# Patient Record
Sex: FEMALE | Race: WHITE | Employment: OTHER | URBAN - NONMETROPOLITAN AREA
[De-identification: names, ages, dates, MRNs, and addresses within clinical notes are randomized per-mention and may not be internally consistent; named-entity substitution may affect disease eponyms.]

---

## 2019-04-15 LAB
CHOLESTEROL, TOTAL: 125 MG/DL
CHOLESTEROL/HDL RATIO: 3.7 CALC
CREATININE: 0.9 MG/DL
HDLC SERPL-MCNC: 34 MG/DL (ref 35–70)
LDL CHOLESTEROL CALCULATED: 69 MG/DL (ref 0–160)
POTASSIUM (K+): 4.4 MMOL/L
TRIGL SERPL-MCNC: 136 MG/DL
TSH SERPL DL<=0.05 MIU/L-ACNC: 3.45 UIU/ML
VLDLC SERPL CALC-MCNC: ABNORMAL MG/DL

## 2019-05-09 ENCOUNTER — TELEPHONE (OUTPATIENT)
Dept: PRIMARY CARE CLINIC | Age: 79
End: 2019-05-09

## 2019-05-09 DIAGNOSIS — R92.8 ABNORMAL MAMMOGRAM OF LEFT BREAST: Primary | ICD-10-CM

## 2019-05-09 NOTE — TELEPHONE ENCOUNTER
Contacted patient. Put orders into [facility] radiology attention Dr. Kimberly Lorenz.  She needs a biopsy of clustered calcification on the left breast.

## 2019-05-09 NOTE — TELEPHONE ENCOUNTER
----- Message from Brandon Sinclair sent at 5/6/2019  8:15 AM EDT -----  Pt came in and said you have been trying to get a hold of her. She said if you call her again you can just leave the results on her answering machine.

## 2019-05-10 NOTE — TELEPHONE ENCOUNTER
Please call appointment desk at [facility]. Referral to radiology Department attention Dr. Ana Corrigan.  Forr an US guided biopsy of left breast. Diagnosis abnormal mammogram

## 2019-05-13 ENCOUNTER — TELEPHONE (OUTPATIENT)
Dept: PRIMARY CARE CLINIC | Age: 79
End: 2019-05-13

## 2019-05-13 NOTE — TELEPHONE ENCOUNTER
[facility] AND THEY WANTED A CLARIFICATION ON THE REFERRAL FOR DR [address]  WAS IT FOR A BIOPSY

## 2019-05-30 DIAGNOSIS — D24.2 FIBROADENOMA OF LEFT BREAST: Primary | ICD-10-CM

## 2019-07-01 RX ORDER — LEVOTHYROXINE SODIUM 0.07 MG/1
TABLET ORAL
Qty: 105 TABLET | Refills: 1 | Status: SHIPPED | OUTPATIENT
Start: 2019-07-01 | End: 2019-07-15 | Stop reason: SDUPTHER

## 2019-07-11 RX ORDER — MECLIZINE HYDROCHLORIDE 25 MG/1
25 TABLET ORAL DAILY PRN
COMMUNITY
End: 2019-07-15 | Stop reason: SDUPTHER

## 2019-07-15 ENCOUNTER — OFFICE VISIT (OUTPATIENT)
Dept: PRIMARY CARE CLINIC | Age: 79
End: 2019-07-15
Payer: MEDICARE

## 2019-07-15 VITALS
HEIGHT: 64 IN | OXYGEN SATURATION: 95 % | HEART RATE: 57 BPM | BODY MASS INDEX: 20.49 KG/M2 | RESPIRATION RATE: 16 BRPM | TEMPERATURE: 98.6 F | SYSTOLIC BLOOD PRESSURE: 150 MMHG | WEIGHT: 120 LBS | DIASTOLIC BLOOD PRESSURE: 90 MMHG

## 2019-07-15 DIAGNOSIS — E78.2 MIXED HYPERLIPIDEMIA: ICD-10-CM

## 2019-07-15 DIAGNOSIS — E03.9 ACQUIRED HYPOTHYROIDISM: ICD-10-CM

## 2019-07-15 DIAGNOSIS — I25.10 CORONARY ARTERY DISEASE INVOLVING NATIVE CORONARY ARTERY OF NATIVE HEART WITHOUT ANGINA PECTORIS: ICD-10-CM

## 2019-07-15 DIAGNOSIS — R42 DIZZINESS: ICD-10-CM

## 2019-07-15 DIAGNOSIS — I10 ESSENTIAL HYPERTENSION: Primary | ICD-10-CM

## 2019-07-15 PROBLEM — E78.5 HYPERLIPIDEMIA: Status: ACTIVE | Noted: 2019-07-15

## 2019-07-15 PROCEDURE — 99214 OFFICE O/P EST MOD 30 MIN: CPT | Performed by: INTERNAL MEDICINE

## 2019-07-15 RX ORDER — ACETAMINOPHEN 160 MG
1 TABLET,DISINTEGRATING ORAL DAILY
COMMUNITY
End: 2019-07-15 | Stop reason: SDUPTHER

## 2019-07-15 RX ORDER — LEVOTHYROXINE SODIUM 0.07 MG/1
TABLET ORAL
Qty: 315 TABLET | Refills: 5 | Status: SHIPPED | OUTPATIENT
Start: 2019-07-15 | End: 2019-10-21 | Stop reason: SDUPTHER

## 2019-07-15 RX ORDER — ATORVASTATIN CALCIUM 80 MG/1
80 TABLET, FILM COATED ORAL DAILY
Refills: 3 | COMMUNITY
Start: 2019-07-10 | End: 2019-07-15 | Stop reason: SDUPTHER

## 2019-07-15 RX ORDER — ATORVASTATIN CALCIUM 80 MG/1
80 TABLET, FILM COATED ORAL DAILY
Qty: 90 TABLET | Refills: 3 | Status: SHIPPED | OUTPATIENT
Start: 2019-07-15 | End: 2019-10-21 | Stop reason: SDUPTHER

## 2019-07-15 RX ORDER — MECLIZINE HYDROCHLORIDE 25 MG/1
25 TABLET ORAL DAILY PRN
Qty: 90 TABLET | Refills: 5 | Status: SHIPPED | OUTPATIENT
Start: 2019-07-15 | End: 2019-10-21 | Stop reason: SDUPTHER

## 2019-07-15 RX ORDER — METOPROLOL TARTRATE 50 MG/1
50 TABLET, FILM COATED ORAL 2 TIMES DAILY
Qty: 180 TABLET | Refills: 1 | Status: SHIPPED | OUTPATIENT
Start: 2019-07-15 | End: 2019-10-21 | Stop reason: SDUPTHER

## 2019-07-15 RX ORDER — ACETAMINOPHEN 160 MG
1 TABLET,DISINTEGRATING ORAL DAILY
Qty: 90 CAPSULE | Refills: 5 | Status: SHIPPED | OUTPATIENT
Start: 2019-07-15 | End: 2019-10-21 | Stop reason: SDUPTHER

## 2019-07-15 SDOH — HEALTH STABILITY: MENTAL HEALTH: HOW OFTEN DO YOU HAVE A DRINK CONTAINING ALCOHOL?: NEVER

## 2019-07-15 ASSESSMENT — ENCOUNTER SYMPTOMS
WHEEZING: 0
PHOTOPHOBIA: 0
COLOR CHANGE: 0
FACIAL SWELLING: 0
SHORTNESS OF BREATH: 0
ABDOMINAL PAIN: 0
EYE ITCHING: 0
BLOOD IN STOOL: 0
ANAL BLEEDING: 0
COUGH: 0
DIARRHEA: 0
CONSTIPATION: 0
RHINORRHEA: 0
VOMITING: 0
NAUSEA: 0
TROUBLE SWALLOWING: 0
EYE DISCHARGE: 0
EYE PAIN: 0
SORE THROAT: 0
STRIDOR: 0

## 2019-07-15 ASSESSMENT — PATIENT HEALTH QUESTIONNAIRE - PHQ9
1. LITTLE INTEREST OR PLEASURE IN DOING THINGS: 0
SUM OF ALL RESPONSES TO PHQ QUESTIONS 1-9: 0
2. FEELING DOWN, DEPRESSED OR HOPELESS: 0
SUM OF ALL RESPONSES TO PHQ QUESTIONS 1-9: 0
SUM OF ALL RESPONSES TO PHQ9 QUESTIONS 1 & 2: 0

## 2019-07-15 NOTE — PROGRESS NOTES
swelling. Gastrointestinal: Negative for abdominal pain, anal bleeding, blood in stool, constipation, diarrhea, nausea and vomiting. Endocrine: Negative for cold intolerance, heat intolerance, polydipsia, polyphagia and polyuria. Genitourinary: Negative for difficulty urinating, dysuria, flank pain, frequency, hematuria and urgency. Musculoskeletal: Negative for arthralgias, gait problem, joint swelling and myalgias. Skin: Negative for color change, pallor and rash. Allergic/Immunologic: Negative for environmental allergies and food allergies. Neurological: Positive for headaches. Negative for dizziness, tremors, seizures, syncope, speech difficulty, weakness, light-headedness and numbness. See HPI   Hematological: Negative for adenopathy. Does not bruise/bleed easily. Psychiatric/Behavioral: Negative for agitation, behavioral problems, confusion, sleep disturbance and suicidal ideas. The patient is not nervous/anxious.            Current Outpatient Medications:     atorvastatin (LIPITOR) 80 MG tablet, Take 1 tablet by mouth daily, Disp: 90 tablet, Rfl: 3    aspirin 81 MG tablet, Take 1 tablet by mouth daily, Disp: 90 tablet, Rfl: 5    Cholecalciferol (VITAMIN D3) 2000 units CAPS, Take 1 capsule by mouth daily, Disp: 90 capsule, Rfl: 5    meclizine (ANTIVERT) 25 MG tablet, Take 1 tablet by mouth daily as needed for Dizziness, Disp: 90 tablet, Rfl: 5    levothyroxine (SYNTHROID) 75 MCG tablet, TAKE 1 TABLET BY MOUTH ONCE DAILY -ON  MONDAY,  WEDNESDAY,  AND  FRIDAY  TAKE  1.5  TABLETS, Disp: 315 tablet, Rfl: 5    metoprolol tartrate (LOPRESSOR) 50 MG tablet, Take 1 tablet by mouth 2 times daily, Disp: 180 tablet, Rfl: 1     Allergies   Allergen Reactions    Adhesive Tape Rash        Past Medical History:   Diagnosis Date    CAD (coronary artery disease)     Colon polyps     GERD (gastroesophageal reflux disease)     Hyperlipidemia     Hypertension     Hypothyroidism     Melanoma [facility])     Osteopenia        Health Maintenance Due   Topic Date Due    Potassium monitoring  1940    Creatinine monitoring  1940    DTaP/Tdap/Td vaccine (1 - Tdap) 10/26/1958    Shingles Vaccine (1 of 2) 10/26/1989    Annual Wellness Visit (AWV)  10/26/2002    DEXA (modify frequency per FRAX score)  10/26/2004    Pneumococcal 65+ years Vaccine (1 of 2 - PCV13) 10/26/2004        Patient Active Problem List   Diagnosis    Essential hypertension    Dizziness    Hypothyroidism    Hyperlipidemia    CAD (coronary artery disease)        Past Surgical History:   Procedure Laterality Date     SECTION      CORONARY ARTERY BYPASS GRAFT  2014    LITHOTRIPSY      CHRISTIAN AND BSO          Family History   Problem Relation Age of Onset    Coronary Art Dis Mother     Other Mother         CVA    Heart Attack Father     Pancreatic Cancer Sister     Lung Cancer Sister         Social History     Tobacco Use    Smoking status: Never Smoker    Smokeless tobacco: Never Used   Substance Use Topics    Alcohol use: Never     Frequency: Never    Drug use: Never        Objective  Vitals:    07/15/19 0800 07/15/19 0806   BP: (!) 152/94 (!) 150/90   Site: Left Upper Arm Left Upper Arm   Position: Sitting Sitting   Cuff Size: Medium Adult Medium Adult   Pulse: 57    Resp: 16    Temp: 98.6 °F (37 °C)    TempSrc: Temporal    SpO2: 95%    Weight: 120 lb (54.4 kg)    Height: 5' 4\" (1.626 m)         Exam:  Physical Exam   Constitutional: She is oriented to person, place, and time. She appears well-developed and well-nourished. HENT:   Head: Normocephalic. Right Ear: External ear normal.   Left Ear: External ear normal.   Nose: Nose normal.   Mouth/Throat: Oropharynx is clear and moist. No oropharyngeal exudate. Eyes: Pupils are equal, round, and reactive to light. Conjunctivae and EOM are normal. Right eye exhibits no discharge. Left eye exhibits no discharge. No scleral icterus.    Neck: Normal range of

## 2019-07-15 NOTE — PATIENT INSTRUCTIONS
Increase Metoprolol to 50 mg twice a day  Monitor blood pressure and pulse  If facial spasm no better in 7-10 days, call and will refer you to a neurologisy

## 2019-10-20 ASSESSMENT — ENCOUNTER SYMPTOMS
EYE DISCHARGE: 0
ABDOMINAL PAIN: 0
NAUSEA: 0
EYE PAIN: 0
RHINORRHEA: 0
DIARRHEA: 0
COLOR CHANGE: 0
EYE ITCHING: 0
SHORTNESS OF BREATH: 0
FACIAL SWELLING: 0
COUGH: 0
STRIDOR: 0
CONSTIPATION: 0
SORE THROAT: 0
TROUBLE SWALLOWING: 0
VOMITING: 0
PHOTOPHOBIA: 0
ANAL BLEEDING: 0
WHEEZING: 0
BLOOD IN STOOL: 0

## 2019-10-21 ENCOUNTER — HOSPITAL ENCOUNTER (OUTPATIENT)
Age: 79
Discharge: HOME | End: 2019-10-23
Payer: MEDICARE

## 2019-10-21 ENCOUNTER — OFFICE VISIT (OUTPATIENT)
Dept: PRIMARY CARE CLINIC | Age: 79
End: 2019-10-21
Payer: MEDICARE

## 2019-10-21 VITALS
SYSTOLIC BLOOD PRESSURE: 128 MMHG | TEMPERATURE: 98.4 F | HEART RATE: 49 BPM | BODY MASS INDEX: 20.66 KG/M2 | RESPIRATION RATE: 16 BRPM | HEIGHT: 64 IN | WEIGHT: 121 LBS | DIASTOLIC BLOOD PRESSURE: 84 MMHG | OXYGEN SATURATION: 96 %

## 2019-10-21 DIAGNOSIS — M54.2 NECK PAIN ON LEFT SIDE: ICD-10-CM

## 2019-10-21 DIAGNOSIS — I25.10 CORONARY ARTERY DISEASE INVOLVING NATIVE CORONARY ARTERY OF NATIVE HEART WITHOUT ANGINA PECTORIS: ICD-10-CM

## 2019-10-21 DIAGNOSIS — I10 ESSENTIAL HYPERTENSION: ICD-10-CM

## 2019-10-21 DIAGNOSIS — E55.9 VITAMIN D DEFICIENCY: ICD-10-CM

## 2019-10-21 DIAGNOSIS — E03.9 ACQUIRED HYPOTHYROIDISM: ICD-10-CM

## 2019-10-21 DIAGNOSIS — Z23 NEED FOR INFLUENZA VACCINATION: ICD-10-CM

## 2019-10-21 DIAGNOSIS — E78.2 MIXED HYPERLIPIDEMIA: ICD-10-CM

## 2019-10-21 DIAGNOSIS — C43.71 MALIGNANT MELANOMA OF RIGHT LOWER EXTREMITY INCLUDING HIP (HCC): ICD-10-CM

## 2019-10-21 DIAGNOSIS — I10 ESSENTIAL HYPERTENSION: Primary | ICD-10-CM

## 2019-10-21 DIAGNOSIS — R42 DIZZINESS: ICD-10-CM

## 2019-10-21 PROBLEM — C43.9 MELANOMA (HCC): Status: ACTIVE | Noted: 2019-10-21

## 2019-10-21 LAB
ALBUMIN SERPL-MCNC: 4.7 G/DL (ref 3.5–5.2)
ALP BLD-CCNC: 126 U/L (ref 35–104)
ALT SERPL-CCNC: 26 U/L (ref 0–32)
ANION GAP SERPL CALCULATED.3IONS-SCNC: 18 MMOL/L (ref 7–16)
AST SERPL-CCNC: 26 U/L (ref 0–31)
BILIRUB SERPL-MCNC: 0.5 MG/DL (ref 0–1.2)
BUN BLDV-MCNC: 18 MG/DL (ref 8–23)
CALCIUM SERPL-MCNC: 10.1 MG/DL (ref 8.6–10.2)
CHLORIDE BLD-SCNC: 105 MMOL/L (ref 98–107)
CHOLESTEROL, TOTAL: 135 MG/DL (ref 0–199)
CO2: 23 MMOL/L (ref 22–29)
CREAT SERPL-MCNC: 1 MG/DL (ref 0.5–1)
GFR AFRICAN AMERICAN: >60
GFR NON-AFRICAN AMERICAN: 53 ML/MIN/1.73
GLUCOSE BLD-MCNC: 101 MG/DL (ref 74–99)
HDLC SERPL-MCNC: 42 MG/DL
LDL CHOLESTEROL CALCULATED: 65 MG/DL (ref 0–99)
POTASSIUM SERPL-SCNC: 4.7 MMOL/L (ref 3.5–5)
SODIUM BLD-SCNC: 146 MMOL/L (ref 132–146)
T4 FREE: 1.67 NG/DL (ref 0.93–1.7)
TOTAL PROTEIN: 8 G/DL (ref 6.4–8.3)
TRIGL SERPL-MCNC: 140 MG/DL (ref 0–149)
TSH SERPL DL<=0.05 MIU/L-ACNC: 5.84 UIU/ML (ref 0.27–4.2)
VLDLC SERPL CALC-MCNC: 28 MG/DL

## 2019-10-21 PROCEDURE — 84439 ASSAY OF FREE THYROXINE: CPT

## 2019-10-21 PROCEDURE — 99214 OFFICE O/P EST MOD 30 MIN: CPT | Performed by: INTERNAL MEDICINE

## 2019-10-21 PROCEDURE — 36415 COLL VENOUS BLD VENIPUNCTURE: CPT

## 2019-10-21 PROCEDURE — 80061 LIPID PANEL: CPT

## 2019-10-21 PROCEDURE — 84443 ASSAY THYROID STIM HORMONE: CPT

## 2019-10-21 PROCEDURE — G0008 ADMIN INFLUENZA VIRUS VAC: HCPCS | Performed by: INTERNAL MEDICINE

## 2019-10-21 PROCEDURE — 80053 COMPREHEN METABOLIC PANEL: CPT

## 2019-10-21 PROCEDURE — 90653 IIV ADJUVANT VACCINE IM: CPT | Performed by: INTERNAL MEDICINE

## 2019-10-21 RX ORDER — MECLIZINE HYDROCHLORIDE 25 MG/1
25 TABLET ORAL DAILY PRN
Qty: 90 TABLET | Refills: 3 | Status: SHIPPED | OUTPATIENT
Start: 2019-10-21 | End: 2020-01-02 | Stop reason: SDUPTHER

## 2019-10-21 RX ORDER — ACETAMINOPHEN 160 MG
1 TABLET,DISINTEGRATING ORAL DAILY
Qty: 90 CAPSULE | Refills: 3 | Status: SHIPPED | OUTPATIENT
Start: 2019-10-21 | End: 2020-01-02 | Stop reason: SDUPTHER

## 2019-10-21 RX ORDER — METOPROLOL TARTRATE 50 MG/1
50 TABLET, FILM COATED ORAL 2 TIMES DAILY
Qty: 180 TABLET | Refills: 3 | Status: SHIPPED | OUTPATIENT
Start: 2019-10-21 | End: 2020-01-02 | Stop reason: SDUPTHER

## 2019-10-21 RX ORDER — LEVOTHYROXINE SODIUM 0.07 MG/1
TABLET ORAL
Qty: 135 TABLET | Refills: 3 | Status: SHIPPED | OUTPATIENT
Start: 2019-10-21 | End: 2020-01-02 | Stop reason: SDUPTHER

## 2019-10-21 RX ORDER — ATORVASTATIN CALCIUM 80 MG/1
80 TABLET, FILM COATED ORAL DAILY
Qty: 90 TABLET | Refills: 3 | Status: SHIPPED | OUTPATIENT
Start: 2019-10-21 | End: 2020-01-02 | Stop reason: SDUPTHER

## 2019-11-04 ENCOUNTER — TELEPHONE (OUTPATIENT)
Dept: PRIMARY CARE CLINIC | Age: 79
End: 2019-11-04

## 2019-11-04 DIAGNOSIS — R92.8 ABNORMAL MAMMOGRAM: Primary | ICD-10-CM

## 2019-11-20 PROBLEM — Z23 NEED FOR INFLUENZA VACCINATION: Status: RESOLVED | Noted: 2019-10-21 | Resolved: 2019-11-20

## 2019-12-19 ASSESSMENT — ENCOUNTER SYMPTOMS
EYE ITCHING: 0
NAUSEA: 0
RHINORRHEA: 0
ABDOMINAL PAIN: 0
BLOOD IN STOOL: 0
ANAL BLEEDING: 0
SORE THROAT: 0
COLOR CHANGE: 0
CONSTIPATION: 0
DIARRHEA: 0
SHORTNESS OF BREATH: 0
EYE DISCHARGE: 0
PHOTOPHOBIA: 0
STRIDOR: 0
VOMITING: 0
FACIAL SWELLING: 0
TROUBLE SWALLOWING: 0
WHEEZING: 0
EYE PAIN: 0
COUGH: 0

## 2019-12-19 NOTE — PROGRESS NOTES
trigemential neutralize, probiotic BID,, Disp: , Rfl:     atorvastatin (LIPITOR) 80 MG tablet, Take 1 tablet by mouth daily, Disp: 90 tablet, Rfl: 3    Cholecalciferol (VITAMIN D3) 50 MCG (2000) CAPS, Take 1 capsule by mouth daily, Disp: 90 capsule, Rfl: 3    levothyroxine (SYNTHROID) 75 MCG tablet, TAKE 1 TABLET BY MOUTH ONCE DAILY -ON  MONDAY,  TAKE 2 TABS, ON WED AND , TAKE 1.5 TABS, Disp: 150 tablet, Rfl: 3    metoprolol tartrate (LOPRESSOR) 50 MG tablet, Take 1 tablet by mouth 2 times daily, Disp: 180 tablet, Rfl: 3    meclizine (ANTIVERT) 25 MG tablet, Take 1 tablet by mouth daily as needed for Dizziness, Disp: 90 tablet, Rfl: 3    aspirin 81 MG tablet, Take 1 tablet by mouth daily, Disp: 90 tablet, Rfl: 5     Allergies   Allergen Reactions    Adhesive Tape Rash        Past Medical History:   Diagnosis Date    CAD (coronary artery disease)     Colon polyps     GERD (gastroesophageal reflux disease)     Hyperlipidemia     Hypothyroidism     [facility] ([email or web address]     Osteopenia        Health Maintenance Due   Topic Date Due    Shingles Vaccine (1 of 2) 10/26/1989    Annual Wellness Visit (AWV)  2019        Patient Active Problem List   Diagnosis    Essential hypertension    Dizziness    Hypothyroidism    Hyperlipidemia    CAD (coronary artery disease)    Melanoma ([email or web address]    Neck pain on left side    Need for vaccination with PVAX (pneumococcal vaccination)    Elevated liver enzymes    Chronic night sweats    Leg cramps, sleep related        Past Surgical History:   Procedure Laterality Date     SECTION      CORONARY ARTERY BYPASS GRAFT      LITHOTRIPSY      CHRISTIAN AND BSO          Family History   Problem Relation Age of Onset    Coronary Art Dis Mother     Other Mother         CVA    Heart Attack Father     Pancreatic Cancer Sister     Lung Cancer Sister         Social History     Tobacco Use    Smoking status: Never Smoker    Smokeless tobacco: Never Used

## 2020-01-02 ENCOUNTER — OFFICE VISIT (OUTPATIENT)
Dept: PRIMARY CARE CLINIC | Age: 80
End: 2020-01-02
Payer: MEDICARE

## 2020-01-02 VITALS
HEIGHT: 64 IN | WEIGHT: 119 LBS | HEART RATE: 52 BPM | RESPIRATION RATE: 16 BRPM | OXYGEN SATURATION: 97 % | DIASTOLIC BLOOD PRESSURE: 88 MMHG | BODY MASS INDEX: 20.32 KG/M2 | SYSTOLIC BLOOD PRESSURE: 132 MMHG | TEMPERATURE: 98.6 F

## 2020-01-02 VITALS
HEART RATE: 52 BPM | TEMPERATURE: 98.6 F | DIASTOLIC BLOOD PRESSURE: 88 MMHG | BODY MASS INDEX: 20.32 KG/M2 | OXYGEN SATURATION: 97 % | HEIGHT: 64 IN | SYSTOLIC BLOOD PRESSURE: 132 MMHG | RESPIRATION RATE: 16 BRPM | WEIGHT: 119 LBS

## 2020-01-02 PROBLEM — R61 CHRONIC NIGHT SWEATS: Status: ACTIVE | Noted: 2020-01-02

## 2020-01-02 PROBLEM — G47.62 LEG CRAMPS, SLEEP RELATED: Status: ACTIVE | Noted: 2020-01-02

## 2020-01-02 PROBLEM — R74.8 ELEVATED LIVER ENZYMES: Status: ACTIVE | Noted: 2020-01-02

## 2020-01-02 PROBLEM — Z23 NEED FOR VACCINATION WITH PVAX (PNEUMOCOCCAL VACCINATION): Status: ACTIVE | Noted: 2020-01-02

## 2020-01-02 PROCEDURE — G0009 ADMIN PNEUMOCOCCAL VACCINE: HCPCS | Performed by: INTERNAL MEDICINE

## 2020-01-02 PROCEDURE — 99213 OFFICE O/P EST LOW 20 MIN: CPT | Performed by: INTERNAL MEDICINE

## 2020-01-02 PROCEDURE — G0439 PPPS, SUBSEQ VISIT: HCPCS | Performed by: INTERNAL MEDICINE

## 2020-01-02 PROCEDURE — 90732 PPSV23 VACC 2 YRS+ SUBQ/IM: CPT | Performed by: INTERNAL MEDICINE

## 2020-01-02 RX ORDER — LEVOTHYROXINE SODIUM 0.07 MG/1
TABLET ORAL
Qty: 150 TABLET | Refills: 3 | Status: CANCELLED | OUTPATIENT
Start: 2020-01-02

## 2020-01-02 RX ORDER — ACETAMINOPHEN 160 MG
1 TABLET,DISINTEGRATING ORAL DAILY
Qty: 90 CAPSULE | Refills: 3 | Status: SHIPPED
Start: 2020-01-02 | End: 2020-07-22 | Stop reason: SDUPTHER

## 2020-01-02 RX ORDER — MECLIZINE HYDROCHLORIDE 25 MG/1
25 TABLET ORAL DAILY PRN
Qty: 90 TABLET | Refills: 3 | Status: SHIPPED
Start: 2020-01-02 | End: 2020-10-22 | Stop reason: SDUPTHER

## 2020-01-02 RX ORDER — ACETAMINOPHEN 160 MG
1 TABLET,DISINTEGRATING ORAL DAILY
Qty: 90 CAPSULE | Refills: 3 | Status: CANCELLED | OUTPATIENT
Start: 2020-01-02

## 2020-01-02 RX ORDER — METOPROLOL TARTRATE 50 MG/1
50 TABLET, FILM COATED ORAL 2 TIMES DAILY
Qty: 180 TABLET | Refills: 3 | Status: CANCELLED | OUTPATIENT
Start: 2020-01-02

## 2020-01-02 RX ORDER — ATORVASTATIN CALCIUM 80 MG/1
80 TABLET, FILM COATED ORAL DAILY
Qty: 90 TABLET | Refills: 3 | Status: CANCELLED | OUTPATIENT
Start: 2020-01-02

## 2020-01-02 RX ORDER — LEVOTHYROXINE SODIUM 0.07 MG/1
TABLET ORAL
Qty: 150 TABLET | Refills: 3 | Status: SHIPPED
Start: 2020-01-02 | End: 2020-07-22 | Stop reason: SDUPTHER

## 2020-01-02 RX ORDER — MECLIZINE HYDROCHLORIDE 25 MG/1
25 TABLET ORAL DAILY PRN
Qty: 90 TABLET | Refills: 3 | Status: CANCELLED | OUTPATIENT
Start: 2020-01-02

## 2020-01-02 RX ORDER — METOPROLOL TARTRATE 50 MG/1
50 TABLET, FILM COATED ORAL 2 TIMES DAILY
Qty: 180 TABLET | Refills: 3 | Status: SHIPPED | OUTPATIENT
Start: 2020-01-02 | End: 2020-01-10

## 2020-01-02 RX ORDER — ATORVASTATIN CALCIUM 80 MG/1
80 TABLET, FILM COATED ORAL DAILY
Qty: 90 TABLET | Refills: 3 | Status: SHIPPED
Start: 2020-01-02 | End: 2020-07-22 | Stop reason: SDUPTHER

## 2020-01-02 ASSESSMENT — PATIENT HEALTH QUESTIONNAIRE - PHQ9
SUM OF ALL RESPONSES TO PHQ QUESTIONS 1-9: 0
SUM OF ALL RESPONSES TO PHQ QUESTIONS 1-9: 0

## 2020-01-02 ASSESSMENT — LIFESTYLE VARIABLES: HOW OFTEN DO YOU HAVE A DRINK CONTAINING ALCOHOL: 0

## 2020-01-02 NOTE — PATIENT INSTRUCTIONS
Personalized Preventive Plan for [facility] - 1/2/2020  Medicare offers a range of preventive health benefits. Some of the tests and screenings are paid in full while other may be subject to a deductible, co-insurance, and/or copay. Some of these benefits include a comprehensive review of your medical history including lifestyle, illnesses that may run in your family, and various assessments and screenings as appropriate. After reviewing your medical record and screening and assessments performed today your provider may have ordered immunizations, labs, imaging, and/or referrals for you. A list of these orders (if applicable) as well as your Preventive Care list are included within your After Visit Summary for your review. Other Preventive Recommendations:    · A preventive eye exam performed by an eye specialist is recommended every 1-2 years to screen for glaucoma; cataracts, macular degeneration, and other eye disorders. · A preventive dental visit is recommended every 6 months. · Try to get at least 150 minutes of exercise per week or 10,000 steps per day on a pedometer . · Order or download the FREE \"Exercise & Physical Activity: Your Everyday Guide\" from [employer]. Call 0-537.257.3461 or search [employer] online. · You need 6543-8958 mg of calcium and 8210-3311 IU of vitamin D per day. It is possible to meet your calcium requirement with diet alone, but a vitamin D supplement is usually necessary to meet this goal.  · When exposed to the sun, use a sunscreen that protects against both UVA and UVB radiation with an SPF of 30 or greater. Reapply every 2 to 3 hours or after sweating, drying off with a towel, or swimming. · Always wear a seat belt when traveling in a car. Always wear a helmet when riding a bicycle or motorcycle. Patient Education        Pneumococcal Polysaccharide Vaccine: What You Need to Know  Why get vaccinated?   Vaccination can

## 2020-01-02 NOTE — ASSESSMENT & PLAN NOTE
Blood pressures are stable. Continue medications and monitor blood pressures at home. Call office if systolics are over 115 over diastolics over 90.   Check CMP

## 2020-01-02 NOTE — PROGRESS NOTES
Medicare Annual Wellness Visit  Name: Darío Lopes Date: 2020   MRN: <[id number]> Sex: Female   Age: [de-identified] y.o. Ethnicity: Non-/Non    : 1940 Race: [facility] is here for [employer]    Screenings for behavioral, psychosocial and functional/safety risks, and cognitive dysfunction are all negative except as indicated below. These results, as well as other patient data from the [address] form, are documented in Flowsheets linked to this Encounter. Allergies   Allergen Reactions    Adhesive Tape Rash       Prior to Visit Medications    Medication Sig Taking?  Authorizing Provider   BIOTIN MAXIMUM PO Take by mouth Yes Historical Provider, MD   ASHWAGANDHA PO Take by mouth 800mg QD Yes Historical Provider, MD   NONFORMULARY Liver detox OTC (Herbal, trigemential neutralize, probiotic BID, Yes Historical Provider, MD   atorvastatin (LIPITOR) 80 MG tablet Take 1 tablet by mouth daily Yes Aditi Bailey DO   Cholecalciferol (VITAMIN D3) 2000 units CAPS Take 1 capsule by mouth daily Yes Aditi Bailey DO   levothyroxine (SYNTHROID) 75 MCG tablet TAKE 1 TABLET BY MOUTH ONCE DAILY -ON  MONDAY,  WEDNESDAY,  AND  FRIDAY  TAKE  1.5  TABLETS  Patient taking differently: TAKE 1 TABLET BY MOUTH ONCE DAILY -ON  MONDAY,  TAKE 2 TABS, ON WED AND , TAKE 1.5 TABS Yes Aditi Bailey DO   metoprolol tartrate (LOPRESSOR) 50 MG tablet Take 1 tablet by mouth 2 times daily Yes Aditi Bailey DO   meclizine (ANTIVERT) 25 MG tablet Take 1 tablet by mouth daily as needed for Dizziness Yes Aditi Bailey DO   aspirin 81 MG tablet Take 1 tablet by mouth daily Yes Alexandro Queen DO       Past Medical History:   Diagnosis Date    CAD (coronary artery disease)     Colon polyps     GERD (gastroesophageal reflux disease)     Hyperlipidemia     Hypertension     Hypothyroidism     Melanoma ([address]     Osteopenia        Past Surgical History:   Procedure Laterality Date     SECTION      CORONARY ARTERY BYPASS GRAFT  2014    LITHOTRIPSY      CHRISTIAN AND BSO         Family History   Problem Relation Age of Onset    Coronary Art Dis Mother     Other Mother         CVA    Heart Attack Father     Pancreatic Cancer Sister     Lung Cancer Sister        CareTeam (Including outside providers/suppliers regularly involved in providing care):   Patient Care Team:  Tracy Gaffney DO as PCP - General (Internal Medicine)  Tracy Gaffney DO as PCP - [facility] Empaneled Provider    Wt Readings from Last 3 Encounters:   20 119 lb (54 kg)   10/21/19 121 lb (54.9 kg)   07/15/19 120 lb (54.4 kg)     Vitals:    20 0757   BP: 132/88   Site: Left Upper Arm   Position: Sitting   Cuff Size: Medium Adult   Pulse: 52   Resp: 16   Temp: 98.6 °F (37 °C)   TempSrc: Temporal   SpO2: 97%   Weight: 119 lb (54 kg)   Height: 5' 4\" (1.626 m)     Body mass index is 20.43 kg/m². Based upon direct observation of the patient, evaluation of cognition reveals recent and remote memory intact. Patient [facility] a [facility] [address] [facility] [facility]. Recommend that they bring copies to scan into the chart. Patient's complete Health Risk Assessment and screening values have been reviewed and are found in Flowsheets. The following problems were reviewed today and where indicated follow up appointments were made and/or referrals ordered. Positive Risk Factor Screenings with Interventions:     General Health:  General  In general, how would you say your health is?: Very Good  In the past 7 days, have you experienced any of the following?  New or Increased Pain, New or Increased Fatigue, Loneliness, Social Isolation, Stress or Anger?: (!) New or Increased Pain  Do you get the social and emotional support that you need?: Yes  Do you have a Living Will?: Yes  General Health Risk Interventions:  · Pain issues: patient declines any further evaluation/treatment for this issue he has some left sacroiliac pain last week which she attributes to pushing a patient in a wheelchair. She is taking Tylenol for this and the pain has  improved. Health Habits/Nutrition:  Health Habits/Nutrition  Do you exercise for at least 20 minutes 2-3 times per week?: Yes  Have you lost any weight without trying in the past 3 months?: No  Do you eat fewer than 2 meals per day?: (!) Yes  Have you seen a dentist within the past year?: Yes  Body mass index is 20.43 kg/m². Health Habits/Nutrition Interventions:  · Nutritional issues:  patient is not ready to address his/her nutritional/weight issues at this time she says she is always eaten about 1 meal a day with snacks in between. She does not want to change her eating habits. Personalized Preventive Plan   Current Health Maintenance Status  Immunization History   Administered Date(s) Administered    Influenza Vaccine, unspecified formulation 10/24/2017    Influenza Virus Vaccine 10/24/2017    Influenza, High Dose (Fluzone 65 yrs and older) 10/10/2018    Influenza, Triv, inactivated, subunit, adjuvanted, IM (Fluad 65 yrs and older) 10/21/2019    Pneumococcal Conjugate 13-valent ([id number]) 02/11/2016    Td (Adult), 2 Lf Tetanus Toxoid, Pf (Td, Absorbed) 05/17/2018    Tdap (Boostrix, Adacel) 10/21/2008        Health Maintenance   Topic Date Due    Shingles Vaccine (1 of 2) 10/26/1989    Pneumococcal 65+ years Vaccine (2 of 2 - PPSV23) 02/11/2017    Annual Wellness Visit (AWV)  06/23/2019    Lipid screen  10/21/2020    TSH testing  10/21/2020    Potassium monitoring  10/21/2020    Creatinine monitoring  10/21/2020    DTaP/Tdap/Td vaccine (3 - Td) 05/17/2028    DEXA (modify frequency per FRAX score)  Completed    Flu vaccine  Completed     Recommendations for Preventive Services Due: see orders and patient instructions/AVS.  .   Recommended screening schedule for the next 5-10 years is provided to the patient in written form: see Patient Instructions/AVS.    Karina was seen today for medicare awv.     Diagnoses and all orders for this visit:    Mixed hyperlipidemia    Vitamin D deficiency    Acquired hypothyroidism    Dizziness    Essential hypertension

## 2020-01-10 ENCOUNTER — TELEPHONE (OUTPATIENT)
Dept: PRIMARY CARE CLINIC | Age: 80
End: 2020-01-10

## 2020-01-10 RX ORDER — METOPROLOL SUCCINATE 100 MG/1
TABLET, EXTENDED RELEASE ORAL
Qty: 60 TABLET | Refills: 5 | Status: SHIPPED
Start: 2020-01-10 | End: 2020-04-14 | Stop reason: SINTOL

## 2020-01-10 NOTE — TELEPHONE ENCOUNTER
Patient picked up new script for Metoprolol 50 mg twice daily. Per Patient she's been taking 100 mg twice daily. Please advise.         Electronically signed by Tu Salcedo LPN on 3/04/92 at 1:57 PM

## 2020-04-09 ENCOUNTER — TELEPHONE (OUTPATIENT)
Dept: PRIMARY CARE CLINIC | Age: 80
End: 2020-04-09

## 2020-04-09 NOTE — TELEPHONE ENCOUNTER
Patient is scheduled for a phone visit with you on 4/14/2020. She is asking if she is supposed to have thyroid bloodwork done in the meantime?  If so, please input orders and let me know so I can fax to [phone] [facility].

## 2020-04-11 LAB
ALBUMIN SERPL-MCNC: NORMAL G/DL
ALP BLD-CCNC: NORMAL U/L
ALT SERPL-CCNC: NORMAL U/L
ANION GAP SERPL CALCULATED.3IONS-SCNC: NORMAL MMOL/L
AST SERPL-CCNC: NORMAL U/L
BILIRUB SERPL-MCNC: NORMAL MG/DL
BUN BLDV-MCNC: 23 MG/DL
CALCIUM SERPL-MCNC: NORMAL MG/DL
CHLORIDE BLD-SCNC: NORMAL MMOL/L
CHOLESTEROL, TOTAL: 130 MG/DL
CHOLESTEROL/HDL RATIO: 3.6
CO2: NORMAL
CREAT SERPL-MCNC: 0.95 MG/DL
GFR CALCULATED: NORMAL
GLUCOSE BLD-MCNC: NORMAL MG/DL
HDLC SERPL-MCNC: 36 MG/DL (ref 35–70)
LDL CHOLESTEROL CALCULATED: 70 MG/DL (ref 0–160)
POTASSIUM SERPL-SCNC: 4.5 MMOL/L
SODIUM BLD-SCNC: NORMAL MMOL/L
T4 FREE: 1.4
TOTAL PROTEIN: NORMAL
TRIGL SERPL-MCNC: 158 MG/DL
TSH SERPL DL<=0.05 MIU/L-ACNC: 3.79 UIU/ML
VLDLC SERPL CALC-MCNC: 94 MG/DL

## 2020-04-14 ENCOUNTER — VIRTUAL VISIT (OUTPATIENT)
Dept: PRIMARY CARE CLINIC | Age: 80
End: 2020-04-14
Payer: MEDICARE

## 2020-04-14 VITALS — BODY MASS INDEX: 19.97 KG/M2 | HEIGHT: 64 IN | WEIGHT: 117 LBS

## 2020-04-14 PROCEDURE — G2012 BRIEF CHECK IN BY MD/QHP: HCPCS | Performed by: INTERNAL MEDICINE

## 2020-04-14 RX ORDER — METOPROLOL SUCCINATE 50 MG/1
50 TABLET, EXTENDED RELEASE ORAL 2 TIMES DAILY
Qty: 180 TABLET | Refills: 1 | Status: SHIPPED
Start: 2020-04-14 | End: 2020-07-22 | Stop reason: SDUPTHER

## 2020-04-14 NOTE — PROGRESS NOTES
Rayna Kelly is a [de-identified] y.o. female evaluated via telephone on 4/14/2020. Consent:  She and/or health care decision maker is aware that that she may receive a bill for this telephone service, depending on her insurance coverage, and has provided verbal consent to proceed: Yes      Documentation:  I communicated with the patient and/or health care decision maker about discussed her complaints of fatigue ever since her metoprolol XL was increased to 100 mg twice daily. Her blood pressures have been stable. Her pulse has not been elevated per patient. We are going to go ahead and decrease her metoprolol XL to 50 mg twice daily. Patient was told to monitor her blood pressure and pulse. Prescription was sent to her pharmacy. Otherwise she has no complaints. Discussed her blood work that was done recently. LDL was excellent. However her HDL had gone down to 36 and her triglycerides were little elevated at 158. She is already on Atorvastatin 80 mg daily and she will continue on this medication    . Details of this discussion including any medical advice provided. She will continue on the lower dose of metoprolol and monitor her vital signs symptoms. She will stay on her other medications as before. And I will see her in 3 months for regular office visit      I affirm this is a Patient Initiated Episode with an Established Patient who has not had a related appointment within my department in the past 7 days or scheduled within the next 24 hours.     Total Time: minutes: 5-10 minutes    Note: not billable if this call serves to triage the patient into an appointment for the relevant concern      Randy Faustin

## 2020-07-22 ENCOUNTER — HOSPITAL ENCOUNTER (OUTPATIENT)
Age: 80
Discharge: HOME | End: 2020-07-24
Payer: MEDICARE

## 2020-07-22 ENCOUNTER — OFFICE VISIT (OUTPATIENT)
Dept: PRIMARY CARE CLINIC | Age: 80
End: 2020-07-22
Payer: MEDICARE

## 2020-07-22 VITALS
HEART RATE: 51 BPM | WEIGHT: 122 LBS | SYSTOLIC BLOOD PRESSURE: 130 MMHG | DIASTOLIC BLOOD PRESSURE: 70 MMHG | HEIGHT: 64 IN | BODY MASS INDEX: 20.83 KG/M2 | RESPIRATION RATE: 18 BRPM | TEMPERATURE: 97.2 F | OXYGEN SATURATION: 97 %

## 2020-07-22 PROBLEM — R14.0 ABDOMINAL DISTENTION: Status: ACTIVE | Noted: 2020-07-22

## 2020-07-22 PROBLEM — E55.9 VITAMIN D DEFICIENCY: Status: ACTIVE | Noted: 2020-07-22

## 2020-07-22 PROBLEM — E89.41 HOT FLASHES DUE TO SURGICAL MENOPAUSE: Status: ACTIVE | Noted: 2020-07-22

## 2020-07-22 PROBLEM — R74.8 ELEVATED LIVER ENZYMES: Status: RESOLVED | Noted: 2020-01-02 | Resolved: 2020-07-22

## 2020-07-22 PROBLEM — Z23 NEED FOR VACCINATION WITH PVAX (PNEUMOCOCCAL VACCINATION): Status: RESOLVED | Noted: 2020-01-02 | Resolved: 2020-07-22

## 2020-07-22 LAB
ALBUMIN SERPL-MCNC: 4.3 G/DL (ref 3.5–5.2)
ALP BLD-CCNC: 100 U/L (ref 35–104)
ALT SERPL-CCNC: 24 U/L (ref 0–32)
ANION GAP SERPL CALCULATED.3IONS-SCNC: 14 MMOL/L (ref 7–16)
AST SERPL-CCNC: 25 U/L (ref 0–31)
BILIRUB SERPL-MCNC: 0.5 MG/DL (ref 0–1.2)
BUN BLDV-MCNC: 19 MG/DL (ref 8–23)
CALCIUM SERPL-MCNC: 9.9 MG/DL (ref 8.6–10.2)
CHLORIDE BLD-SCNC: 103 MMOL/L (ref 98–107)
CHOLESTEROL, TOTAL: 120 MG/DL (ref 0–199)
CO2: 25 MMOL/L (ref 22–29)
CREAT SERPL-MCNC: 0.9 MG/DL (ref 0.5–1)
GFR AFRICAN AMERICAN: >60
GFR NON-AFRICAN AMERICAN: >60 ML/MIN/1.73
GLUCOSE BLD-MCNC: 89 MG/DL (ref 74–99)
HDLC SERPL-MCNC: 31 MG/DL
LDL CHOLESTEROL CALCULATED: 47 MG/DL (ref 0–99)
POTASSIUM SERPL-SCNC: 4.9 MMOL/L (ref 3.5–5)
SODIUM BLD-SCNC: 142 MMOL/L (ref 132–146)
T4 FREE: 1.79 NG/DL (ref 0.93–1.7)
TOTAL PROTEIN: 7.3 G/DL (ref 6.4–8.3)
TRIGL SERPL-MCNC: 208 MG/DL (ref 0–149)
TSH SERPL DL<=0.05 MIU/L-ACNC: 1.73 UIU/ML (ref 0.27–4.2)
VLDLC SERPL CALC-MCNC: 42 MG/DL

## 2020-07-22 PROCEDURE — 80061 LIPID PANEL: CPT

## 2020-07-22 PROCEDURE — 36415 COLL VENOUS BLD VENIPUNCTURE: CPT

## 2020-07-22 PROCEDURE — 84443 ASSAY THYROID STIM HORMONE: CPT

## 2020-07-22 PROCEDURE — 84439 ASSAY OF FREE THYROXINE: CPT

## 2020-07-22 PROCEDURE — 99214 OFFICE O/P EST MOD 30 MIN: CPT | Performed by: INTERNAL MEDICINE

## 2020-07-22 PROCEDURE — 80053 COMPREHEN METABOLIC PANEL: CPT

## 2020-07-22 RX ORDER — ATORVASTATIN CALCIUM 80 MG/1
80 TABLET, FILM COATED ORAL DAILY
Qty: 90 TABLET | Refills: 3 | Status: SHIPPED
Start: 2020-07-22 | End: 2020-10-22 | Stop reason: SDUPTHER

## 2020-07-22 RX ORDER — ACETAMINOPHEN 160 MG
1 TABLET,DISINTEGRATING ORAL DAILY
Qty: 90 CAPSULE | Refills: 3 | Status: SHIPPED
Start: 2020-07-22 | End: 2020-10-22 | Stop reason: SDUPTHER

## 2020-07-22 RX ORDER — METOPROLOL SUCCINATE 50 MG/1
50 TABLET, EXTENDED RELEASE ORAL 2 TIMES DAILY
Qty: 180 TABLET | Refills: 1 | Status: SHIPPED
Start: 2020-07-22 | End: 2020-09-09 | Stop reason: SDUPTHER

## 2020-07-22 RX ORDER — LEVOTHYROXINE SODIUM 0.07 MG/1
TABLET ORAL
Qty: 150 TABLET | Refills: 3 | Status: SHIPPED
Start: 2020-07-22 | End: 2020-10-22 | Stop reason: SDUPTHER

## 2020-07-22 RX ORDER — ESTRADIOL 0.1 MG/G
1 CREAM VAGINAL DAILY
Qty: 1 TUBE | Refills: 3 | Status: SHIPPED
Start: 2020-07-22 | End: 2020-10-22 | Stop reason: ALTCHOICE

## 2020-07-22 ASSESSMENT — ENCOUNTER SYMPTOMS
VOMITING: 0
SHORTNESS OF BREATH: 0
EYE DISCHARGE: 0
STRIDOR: 0
PHOTOPHOBIA: 0
DIARRHEA: 0
FACIAL SWELLING: 0
SORE THROAT: 0
ANAL BLEEDING: 0
WHEEZING: 0
EYE PAIN: 0
EYE ITCHING: 0
CONSTIPATION: 0
ABDOMINAL PAIN: 0
ABDOMINAL DISTENTION: 1
RHINORRHEA: 0
NAUSEA: 0
BLOOD IN STOOL: 0
COLOR CHANGE: 0
TROUBLE SWALLOWING: 0
COUGH: 0

## 2020-07-22 NOTE — ASSESSMENT & PLAN NOTE
Trial estradiol 0.1 mg vaginal cream 1 applicatorful every other day and see if that helps her hot flashes if not we will increase it to once a day. Monitor liver enzymes and get yearly mammograms while on  the estrogen.   Note her last liver enzymes were within normal limits

## 2020-07-22 NOTE — PROGRESS NOTES
Shingrix. Blood work in October 2019 showed fasting blood sugar of 101, alkaline phosphatase slightly elevated at 126 with the rest of her liver enzymes being normal.  Her TSH was slightly elevated so we increased her levothyroxine. She is on 75 mcg a day. She takes 2 pills on Monday, 1-1/2 pills on Wednesday and Friday and 1 pill the rest of the week. recheck  TSH. Last bone density scan was done in 2015. We discussed risk and repeating that and she declines. She has a history of malignant melanoma of her leg. She follows up yearly with Dr. Michael Reed for surveillance. She also complains of leg cramps at night. She is tried dill pickle juice and other over-the-counter remedies. Nothing  worked. We discussed putting her on Requip but she declines as she does not want to take any more medications. .  Review of Systems   Constitutional: Negative for appetite change, fatigue and unexpected weight change. HENT: Negative for congestion, ear pain, facial swelling, rhinorrhea, sore throat, tinnitus and trouble swallowing. Left neck discomfort   Eyes: Negative for photophobia, pain, discharge, itching and visual disturbance. Respiratory: Negative for cough, shortness of breath, wheezing and stridor. Cardiovascular: Negative for chest pain, palpitations and leg swelling. Gastrointestinal: Positive for abdominal distention. Negative for abdominal pain, anal bleeding, blood in stool, constipation, diarrhea, nausea and vomiting. Endocrine: Negative for cold intolerance, heat intolerance, polydipsia, polyphagia and polyuria. Genitourinary: Negative for difficulty urinating, dysuria, flank pain, frequency, hematuria and urgency. Night sweats, see HPI   Musculoskeletal: Negative for arthralgias, gait problem, joint swelling and myalgias. See HPI   Skin: Negative for color change, pallor and rash. Allergic/Immunologic: Negative for environmental allergies and food allergies. is no guarding or rebound. Comments: Abdomen slightly distended. No hepatosplenomegaly noted. Musculoskeletal: Normal range of motion. General: No tenderness or deformity. Lymphadenopathy:      Cervical: No cervical adenopathy. Skin:     General: Skin is warm and dry. Coloration: Skin is not pale. Findings: No erythema or rash. Neurological:      General: No focal deficit present. Mental Status: She is alert and oriented to person, place, and time. Cranial Nerves: No cranial nerve deficit. Sensory: No sensory deficit. Deep Tendon Reflexes: Reflexes normal.   Psychiatric:         Mood and Affect: Mood normal.         Behavior: Behavior normal.         Thought Content: Thought content normal.         Judgment: Judgment normal.          Last labs reviewed. ASSESSMENT & PLAN :   Problem List        Circulatory    Essential hypertension     Blood pressures are stable. Continue medications and monitor blood pressures at home. Call office if systolics are over 166 over diastolics over 90. Check fasting CMP about couple of weeks before her next office visit         Relevant Medications    metoprolol succinate (TOPROL XL) 50 MG extended release tablet    Other Relevant Orders    Comprehensive Metabolic Panel    CAD (coronary artery disease)     Continue low-dose aspirin 81 mg. Follow-up with her cardiologist as directed         Relevant Medications    aspirin 81 MG tablet    metoprolol succinate (TOPROL XL) 50 MG extended release tablet    atorvastatin (LIPITOR) 80 MG tablet       Endocrine    Hypothyroidism     Continue thyroid medications. Check TSH and free T4 before her next office visit         Relevant Medications    levothyroxine (SYNTHROID) 75 MCG tablet    Other Relevant Orders    T4, Free    TSH without Reflex       Other    Dizziness    Relevant Medications    meclizine (ANTIVERT) 25 MG tablet    Hyperlipidemia     Continue atorvastatin 80 mg at night. Watch saturated fats in her diet. Check fasting lipid profile before her next office visit         Relevant Medications    aspirin 81 MG tablet    metoprolol succinate (TOPROL XL) 50 MG extended release tablet    atorvastatin (LIPITOR) 80 MG tablet    Other Relevant Orders    Lipid Panel    Melanoma ([facility]     Follow-up with Dr. 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 Monitor liver enzymes and get yearly mammograms while on  the estrogen. Note her last liver enzymes were within normal limits         Relevant Medications    estradiol (ESTRACE VAGINAL) 0.1 MG/GM vaginal cream    Abdominal distention - Primary     Check ultrasound of her abdomen. If unrevealing may need a CT scan of her abdomen and pelvis. Return in about 3 months (around 10/22/2020).        Keeley Prince,   7/22/2020

## 2020-07-22 NOTE — ASSESSMENT & PLAN NOTE
Blood pressures are stable. Continue medications and monitor blood pressures at home. Call office if systolics are over 274 over diastolics over 90.   Check fasting CMP about couple of weeks before her next office visit

## 2020-07-22 NOTE — PATIENT INSTRUCTIONS
Get fasting blood work one week before next offic vsit  Schedule your ultrasound at [facility]

## 2020-09-09 RX ORDER — METOPROLOL SUCCINATE 50 MG/1
50 TABLET, EXTENDED RELEASE ORAL 2 TIMES DAILY
Qty: 180 TABLET | Refills: 1 | Status: SHIPPED
Start: 2020-09-09 | End: 2020-10-22 | Stop reason: SDUPTHER

## 2020-10-22 ENCOUNTER — OFFICE VISIT (OUTPATIENT)
Dept: PRIMARY CARE CLINIC | Age: 80
End: 2020-10-22
Payer: MEDICARE

## 2020-10-22 VITALS
BODY MASS INDEX: 20.94 KG/M2 | TEMPERATURE: 97.8 F | SYSTOLIC BLOOD PRESSURE: 158 MMHG | WEIGHT: 122 LBS | OXYGEN SATURATION: 98 % | HEART RATE: 60 BPM | DIASTOLIC BLOOD PRESSURE: 80 MMHG

## 2020-10-22 PROBLEM — E89.41 HOT FLASHES DUE TO SURGICAL MENOPAUSE: Status: RESOLVED | Noted: 2020-07-22 | Resolved: 2020-10-22

## 2020-10-22 PROBLEM — Z12.31 ENCOUNTER FOR SCREENING MAMMOGRAM FOR MALIGNANT NEOPLASM OF BREAST: Status: ACTIVE | Noted: 2020-10-22

## 2020-10-22 PROCEDURE — 90694 VACC AIIV4 NO PRSRV 0.5ML IM: CPT | Performed by: INTERNAL MEDICINE

## 2020-10-22 PROCEDURE — G0008 ADMIN INFLUENZA VIRUS VAC: HCPCS | Performed by: INTERNAL MEDICINE

## 2020-10-22 PROCEDURE — 99214 OFFICE O/P EST MOD 30 MIN: CPT | Performed by: INTERNAL MEDICINE

## 2020-10-22 RX ORDER — MECLIZINE HYDROCHLORIDE 25 MG/1
25 TABLET ORAL DAILY PRN
Qty: 90 TABLET | Refills: 3 | Status: SHIPPED
Start: 2020-10-22 | End: 2021-06-14 | Stop reason: SDUPTHER

## 2020-10-22 RX ORDER — METOPROLOL SUCCINATE 50 MG/1
50 TABLET, EXTENDED RELEASE ORAL 2 TIMES DAILY
Qty: 180 TABLET | Refills: 1 | Status: SHIPPED
Start: 2020-10-22 | End: 2021-06-14 | Stop reason: SDUPTHER

## 2020-10-22 RX ORDER — ATORVASTATIN CALCIUM 80 MG/1
80 TABLET, FILM COATED ORAL DAILY
Qty: 90 TABLET | Refills: 3 | Status: SHIPPED
Start: 2020-10-22 | End: 2021-06-14 | Stop reason: SDUPTHER

## 2020-10-22 RX ORDER — LEVOTHYROXINE SODIUM 0.07 MG/1
TABLET ORAL
Qty: 150 TABLET | Refills: 3 | Status: SHIPPED
Start: 2020-10-22 | End: 2021-06-14 | Stop reason: SDUPTHER

## 2020-10-22 RX ORDER — ACETAMINOPHEN 160 MG
1 TABLET,DISINTEGRATING ORAL DAILY
Qty: 90 CAPSULE | Refills: 3 | Status: SHIPPED
Start: 2020-10-22 | End: 2021-06-14 | Stop reason: SDUPTHER

## 2020-10-22 RX ORDER — ESTRADIOL 0.1 MG/G
1 CREAM VAGINAL DAILY
Qty: 1 TUBE | Refills: 3 | Status: CANCELLED | OUTPATIENT
Start: 2020-10-22

## 2020-10-22 ASSESSMENT — ENCOUNTER SYMPTOMS
RHINORRHEA: 0
EYE ITCHING: 0
VOMITING: 0
TROUBLE SWALLOWING: 0
CONSTIPATION: 0
STRIDOR: 0
WHEEZING: 0
COLOR CHANGE: 0
BLOOD IN STOOL: 0
NAUSEA: 0
PHOTOPHOBIA: 0
ABDOMINAL DISTENTION: 1
FACIAL SWELLING: 0
SHORTNESS OF BREATH: 0
EYE DISCHARGE: 0
COUGH: 0
DIARRHEA: 0
ABDOMINAL PAIN: 0
SORE THROAT: 0
ANAL BLEEDING: 0
EYE PAIN: 0

## 2020-10-22 NOTE — PROGRESS NOTES
10/22/2020    Name: Mark Sands : 1940 Sex: female  Age: [de-identified] y.o. Subjective:  Chief Complaint   Patient presents with    Hypertension        Hypertension   Pertinent negatives include no chest pain, headaches, palpitations or shortness of breath. This [de-identified]y.o. -year-old female  presents today for evaluation and management of her  chronic medical problems. Current medication list reviewed. The patient is tolerating all medications well without adverse events or known side effects. The patient does understand the risk and benefits of the prescribed medications. She complains of abdominal bloating. Mainly in the lower abdomen. Sometimes little bit of epigastric discomfort. She has had a total abdominal hysterectomy. No problems with bowel or bladder. We had an ultrasound of her abdomen which showed gallstones. She is not ready to have surgery. Patient has a history of coronary artery disease, hyperlipidemia and hypothyroidism. She has a history of hypertension as well. She saw her cardiologist Dr. Kenn Mayberry ago in 2019. Reviewed his report. No new changes or procedures done. She had a previous echocardiogram which was normal.  She will be seeing him again this December   She had a mammogram which resulted in biopsy with which was benign. Repeat mammogram in 2019 showed post biopsy clip in the left breast otherwise negative for malignancy. Colonoscopy was done by Dr. Uzma Matos in 2018    Reviewed her immunization record . She is up-to-date on everything except Shingrix. She declines Shingrix . Last bone density scan was done in . We discussed risk of osteoporosis and recommended repeating that and she declines. She has a history of malignant melanoma of her leg. She follows up yearly with Dr. Kiran Covington for surveillance. Blood work in 2020 showed normal lipids except for triglycerides of 208.   Normal CMP normal TSH however her free T4 was slightly elevated. We will keep her on the same dose and recheck her TSH at her next office visit. She will continue watching carbs and sweets in her diet    She also complains of leg cramps at night. She is tried dill pickle juice and other over-the-counter remedies. Nothing  worked. We discussed putting her on Requip but she declines as she does not want to take any more medications. .  Review of Systems   Constitutional: Negative for appetite change, fatigue and unexpected weight change. HENT: Negative for congestion, ear pain, facial swelling, rhinorrhea, sore throat, tinnitus and trouble swallowing. Eyes: Negative for photophobia, pain, discharge, itching and visual disturbance. Respiratory: Negative for cough, shortness of breath, wheezing and stridor. Cardiovascular: Negative for chest pain, palpitations and leg swelling. Gastrointestinal: Positive for abdominal distention. Negative for abdominal pain, anal bleeding, blood in stool, constipation, diarrhea, nausea and vomiting. Endocrine: Negative for cold intolerance, heat intolerance, polydipsia, polyphagia and polyuria. Genitourinary: Negative for difficulty urinating, dysuria, flank pain, frequency, hematuria and urgency. Musculoskeletal: Negative for arthralgias, gait problem, joint swelling and myalgias. Skin: Negative for color change, pallor and rash. Allergic/Immunologic: Negative for environmental allergies and food allergies. Neurological: Negative for dizziness, tremors, seizures, syncope, speech difficulty, weakness, light-headedness, numbness and headaches. Hematological: Negative for adenopathy. Does not bruise/bleed easily. Psychiatric/Behavioral: Negative for agitation, behavioral problems, confusion, sleep disturbance and suicidal ideas. The patient is not nervous/anxious.            Current Outpatient Medications:     metoprolol succinate (TOPROL XL) 50 MG extended release tablet, Take 1 tablet by mouth 2 times daily, Disp: 180 tablet, Rfl: 1    meclizine (ANTIVERT) 25 MG tablet, Take 1 tablet by mouth daily as needed for Dizziness, Disp: 90 tablet, Rfl: 3    levothyroxine (SYNTHROID) 75 MCG tablet, TAKE 1 TABLET BY MOUTH ONCE DAILY -ON  MONDAY,  TAKE 2 TABS, ON WED AND , TAKE 1.5 TABS, Disp: 150 tablet, Rfl: 3    Cholecalciferol (VITAMIN D3) 50 MCG ( UT) CAPS, Take 1 capsule by mouth daily, Disp: 90 capsule, Rfl: 3    atorvastatin (LIPITOR) 80 MG tablet, Take 1 tablet by mouth daily, Disp: 90 tablet, Rfl: 3    BIOTIN MAXIMUM PO, Take by mouth, Disp: , Rfl:     NONFORMULARY, Liver detox OTC (Herbal, probiotic BID,, Disp: , Rfl:     aspirin 81 MG tablet, Take 1 tablet by mouth daily, Disp: 90 tablet, Rfl: 5     Allergies   Allergen Reactions    Adhesive Tape Rash        Past Medical History:   Diagnosis Date    CAD (coronary artery disease)     Colon polyps     GERD (gastroesophageal reflux disease)     Hot flashes due to surgical menopause 2020    Hyperlipidemia     Hypothyroidism     [facility] [address]     Osteopenia        Health Maintenance Due   Topic Date Due    Shingles Vaccine (1 of 2) 10/26/1989        Patient Active Problem List   Diagnosis    Essential hypertension    Dizziness    Hypothyroidism    Hyperlipidemia    CAD (coronary artery disease)    [facility] [address]    Neck pain on left side    Flu vaccine need    Leg cramps, sleep related    Vitamin D deficiency    Abdominal distention    Encounter for screening mammogram for malignant neoplasm of breast        Past Surgical History:   Procedure Laterality Date     SECTION      CORONARY ARTERY BYPASS GRAFT  2014    LITHOTRIPSY      CHRISTIAN AND BSO          Family History   Problem Relation Age of Onset    Coronary Art Dis Mother     Other Mother         CVA    Heart Attack Father     Pancreatic Cancer Sister     Lung Cancer Sister         Social History     Tobacco Use    Smoking status: Never Smoker    Smokeless tobacco: Never Used   Substance Use Topics    Alcohol use: Never     Frequency: Never    Drug use: Never        Objective  Vitals:    10/22/20 0726 10/22/20 0729   BP: (!) 158/80 (!) 158/80   Pulse: 60    Temp: 97.8 °F (36.6 °C)    TempSrc: Temporal    SpO2: 98%    Weight: 122 lb (55.3 kg)         Exam:  Physical Exam  Vitals signs reviewed. Constitutional:       Appearance: She is well-developed. HENT:      Head: Normocephalic. Right Ear: External ear normal.      Left Ear: External ear normal.      Nose: Nose normal.      Mouth/Throat:      Pharynx: No oropharyngeal exudate. Eyes:      General: No scleral icterus. Right eye: No discharge. Left eye: No discharge. Conjunctiva/sclera: Conjunctivae normal.      Pupils: Pupils are equal, round, and reactive to light. Neck:      Musculoskeletal: Normal range of motion and neck supple. Thyroid: No thyromegaly. Cardiovascular:      Rate and Rhythm: Normal rate and regular rhythm. Heart sounds: Normal heart sounds. No murmur. No friction rub. No gallop. Pulmonary:      Effort: Pulmonary effort is normal. No respiratory distress. Breath sounds: Normal breath sounds. No wheezing or rales. Chest:      Chest wall: No tenderness. Abdominal:      General: Bowel sounds are normal. There is distension. Palpations: Abdomen is soft. There is no mass. Tenderness: There is no abdominal tenderness. There is no guarding or rebound. Comments: Abdomen slightly distended. No hepatosplenomegaly noted. Musculoskeletal: Normal range of motion. General: No tenderness or deformity. Lymphadenopathy:      Cervical: No cervical adenopathy. Skin:     General: Skin is warm and dry. Coloration: Skin is not pale. Findings: No erythema or rash. Neurological:      General: No focal deficit present. Mental Status: She is alert and oriented to person, place, and time. Cranial Nerves:  No cranial nerve deficit. Sensory: No sensory deficit. Deep Tendon Reflexes: Reflexes normal.   Psychiatric:         Mood and Affect: Mood normal.         Behavior: Behavior normal.         Thought Content: Thought content normal.         Judgment: Judgment normal.          Last labs reviewed. ASSESSMENT & PLAN :   Problem List        Circulatory    Essential hypertension - Primary     Blood pressures are stable. Continue medications and monitor blood pressures at home. Call office if systolics are over 276 over diastolics over 90. Relevant Medications    metoprolol succinate (TOPROL XL) 50 MG extended release tablet    CAD (coronary artery disease)     No angina, follow-up with her cardiologist in [facility] December 2020. Continue medications         Relevant Medications    aspirin 81 MG tablet    metoprolol succinate (TOPROL XL) 50 MG extended release tablet    atorvastatin (LIPITOR) 80 MG tablet       Endocrine    Hypothyroidism     Stay on present dose of levothyroxine 75 mcg and recheck TSH and free T4 next office visit. If free T4 increases will need to adjust dose of medication         Relevant Medications    levothyroxine (SYNTHROID) 75 MCG tablet       Other    Dizziness    Relevant Medications    meclizine (ANTIVERT) 25 MG tablet    Hyperlipidemia     Triglycerides are elevated so watch carbs and sweets. We will recheck lipids next office visit.   Continue atorvastatin 80 mg daily         Relevant Medications    aspirin 81 MG tablet    metoprolol succinate (TOPROL XL) 50 MG extended release tablet    atorvastatin (LIPITOR) 80 MG tablet    Flu vaccine need     High-dose flu shot given         Relevant Orders    INFLUENZA, QUADV, ADJUVANTED, 72 YRS =, IM, PF, PREFILL SYR, 0.5ML (FLUAD) (Completed)    Vitamin D deficiency     Continue over-the-counter vitamin D         Relevant Medications    Cholecalciferol (VITAMIN D3) 50 MCG (2000 UT) CAPS    Encounter for screening mammogram for malignant neoplasm of breast     Requisition for mammogram given         Relevant Orders    RENATA DIGITAL SCREEN W OR WO CAD BILATERAL    RESOLVED: Hot flashes due to surgical menopause           Return in about 3 months (around 1/22/2021), or AWV and office visit.        Veronica Joshua, DO  10/22/2020

## 2020-10-22 NOTE — ASSESSMENT & PLAN NOTE
No angina, follow-up with her cardiologist in [facility] December 2020.   Continue medications

## 2020-10-22 NOTE — PROGRESS NOTES
Vaccine Information Sheet, \"Influenza - Inactivated\"  given to [facility], or parent/legal guardian of  [facility] and verbalized understanding. Patient responses:    Have you ever had a reaction to a flu vaccine? No  Do you have any current illness? No  Have you ever had Guillian Durham Syndrome? No  Do you have a serious allergy to any of the follow: Neomycin, Polymyxin, Thimerosal, eggs or egg products? No    Flu vaccine given per order. Please see immunization tab. Risks and benefits explained. Current VIS given.       Immunizations Administered     Name Date Dose Route    Influenza, Quadv, adjuvanted, 65 yrs +, IM, PF (Fluad) 10/22/2020 0.5 mL Intramuscular    Site: Deltoid- Left    Lot: [id number]    NDC: 41122-723-27

## 2020-10-22 NOTE — ASSESSMENT & PLAN NOTE
Triglycerides are elevated so watch carbs and sweets. We will recheck lipids next office visit.   Continue atorvastatin 80 mg daily

## 2020-10-22 NOTE — ASSESSMENT & PLAN NOTE
Stay on present dose of levothyroxine 75 mcg and recheck TSH and free T4 next office visit.   If free T4 increases will need to adjust dose of medication

## 2020-11-21 PROBLEM — Z12.31 ENCOUNTER FOR SCREENING MAMMOGRAM FOR MALIGNANT NEOPLASM OF BREAST: Status: RESOLVED | Noted: 2020-10-22 | Resolved: 2020-11-21

## 2020-12-10 ENCOUNTER — TELEPHONE (OUTPATIENT)
Dept: PRIMARY CARE CLINIC | Age: 80
End: 2020-12-10

## 2020-12-10 NOTE — TELEPHONE ENCOUNTER
Last Appointment:  10/22/2020  Future Appointments   Date Time Provider Lee Ann Cash   1/28/2021  7:45 AM [address] [place] [address]   1/28/2021  8:00 AM [address] [place] [address]      Patient called regarding mammogram results. Advised dense tissue should get US. Patient is agreeable. She would like order faxed to [facility] and she will call to schedule appointment.     Electronically signed by Anthony Mota LPN on 75/16/2086 at 2:07 PM

## 2021-01-25 ENCOUNTER — OFFICE VISIT (OUTPATIENT)
Dept: PRIMARY CARE CLINIC | Age: 81
End: 2021-01-25
Payer: MEDICARE

## 2021-01-25 VITALS
OXYGEN SATURATION: 97 % | BODY MASS INDEX: 21.34 KG/M2 | DIASTOLIC BLOOD PRESSURE: 62 MMHG | HEART RATE: 77 BPM | HEIGHT: 64 IN | WEIGHT: 125 LBS | SYSTOLIC BLOOD PRESSURE: 112 MMHG | TEMPERATURE: 97.6 F

## 2021-01-25 DIAGNOSIS — U07.1 COVID-19 VIRUS INFECTION: Primary | ICD-10-CM

## 2021-01-25 DIAGNOSIS — E03.9 ACQUIRED HYPOTHYROIDISM: ICD-10-CM

## 2021-01-25 DIAGNOSIS — I10 ESSENTIAL HYPERTENSION: ICD-10-CM

## 2021-01-25 DIAGNOSIS — R04.0 EPISTAXIS: ICD-10-CM

## 2021-01-25 DIAGNOSIS — U07.1 COVID-19 VIRUS INFECTION: ICD-10-CM

## 2021-01-25 LAB
ALBUMIN SERPL-MCNC: 4.1 G/DL (ref 3.5–5.2)
ALP BLD-CCNC: 114 U/L (ref 35–104)
ALT SERPL-CCNC: 22 U/L (ref 0–32)
ANION GAP SERPL CALCULATED.3IONS-SCNC: 12 MMOL/L (ref 7–16)
AST SERPL-CCNC: 23 U/L (ref 0–31)
BASOPHILS ABSOLUTE: 0.04 E9/L (ref 0–0.2)
BASOPHILS RELATIVE PERCENT: 0.5 % (ref 0–2)
BILIRUB SERPL-MCNC: 0.3 MG/DL (ref 0–1.2)
BUN BLDV-MCNC: 26 MG/DL (ref 8–23)
CALCIUM SERPL-MCNC: 9.5 MG/DL (ref 8.6–10.2)
CHLORIDE BLD-SCNC: 101 MMOL/L (ref 98–107)
CO2: 27 MMOL/L (ref 22–29)
CREAT SERPL-MCNC: 1 MG/DL (ref 0.5–1)
EOSINOPHILS ABSOLUTE: 0.27 E9/L (ref 0.05–0.5)
EOSINOPHILS RELATIVE PERCENT: 3.4 % (ref 0–6)
GFR AFRICAN AMERICAN: >60
GFR NON-AFRICAN AMERICAN: 53 ML/MIN/1.73
GLUCOSE BLD-MCNC: 97 MG/DL (ref 74–99)
HCT VFR BLD CALC: 38.3 % (ref 34–48)
HEMOGLOBIN: 11.8 G/DL (ref 11.5–15.5)
IMMATURE GRANULOCYTES #: 0.04 E9/L
IMMATURE GRANULOCYTES %: 0.5 % (ref 0–5)
INR BLD: 1
LYMPHOCYTES ABSOLUTE: 1.68 E9/L (ref 1.5–4)
LYMPHOCYTES RELATIVE PERCENT: 21.4 % (ref 20–42)
MCH RBC QN AUTO: 30.4 PG (ref 26–35)
MCHC RBC AUTO-ENTMCNC: 30.8 % (ref 32–34.5)
MCV RBC AUTO: 98.7 FL (ref 80–99.9)
MONOCYTES ABSOLUTE: 0.91 E9/L (ref 0.1–0.95)
MONOCYTES RELATIVE PERCENT: 11.6 % (ref 2–12)
NEUTROPHILS ABSOLUTE: 4.91 E9/L (ref 1.8–7.3)
NEUTROPHILS RELATIVE PERCENT: 62.6 % (ref 43–80)
PDW BLD-RTO: 12.5 FL (ref 11.5–15)
PLATELET # BLD: 312 E9/L (ref 130–450)
PMV BLD AUTO: 10.6 FL (ref 7–12)
POTASSIUM SERPL-SCNC: 4.4 MMOL/L (ref 3.5–5)
PROTHROMBIN TIME: 11.3 SEC (ref 9.3–12.4)
RBC # BLD: 3.88 E12/L (ref 3.5–5.5)
SODIUM BLD-SCNC: 140 MMOL/L (ref 132–146)
T4 FREE: 1.51 NG/DL (ref 0.93–1.7)
TOTAL PROTEIN: 7.6 G/DL (ref 6.4–8.3)
TSH SERPL DL<=0.05 MIU/L-ACNC: 2.64 UIU/ML (ref 0.27–4.2)
WBC # BLD: 7.9 E9/L (ref 4.5–11.5)

## 2021-01-25 PROCEDURE — 99213 OFFICE O/P EST LOW 20 MIN: CPT | Performed by: INTERNAL MEDICINE

## 2021-01-25 RX ORDER — SIMVASTATIN 40 MG
40 TABLET ORAL NIGHTLY
COMMUNITY
End: 2021-01-25 | Stop reason: CLARIF

## 2021-01-25 ASSESSMENT — PATIENT HEALTH QUESTIONNAIRE - PHQ9
1. LITTLE INTEREST OR PLEASURE IN DOING THINGS: 0
SUM OF ALL RESPONSES TO PHQ QUESTIONS 1-9: 0
SUM OF ALL RESPONSES TO PHQ QUESTIONS 1-9: 0

## 2021-01-25 ASSESSMENT — ENCOUNTER SYMPTOMS
ANAL BLEEDING: 0
EYE PAIN: 0
CONSTIPATION: 0
FACIAL SWELLING: 0
SHORTNESS OF BREATH: 0
SORE THROAT: 0
RHINORRHEA: 0
WHEEZING: 0
DIARRHEA: 0
BLOOD IN STOOL: 0
COLOR CHANGE: 0
EYE DISCHARGE: 0
STRIDOR: 0
VOMITING: 0
NAUSEA: 0
PHOTOPHOBIA: 0
ABDOMINAL PAIN: 0
COUGH: 0
EYE ITCHING: 0
TROUBLE SWALLOWING: 0

## 2021-01-25 NOTE — ASSESSMENT & PLAN NOTE
Continue present dose of levothyroxine  Check TSH and free T4 as it has been a while since these were checked

## 2021-01-25 NOTE — PROGRESS NOTES
2021    Name: Charline Jenkins : 1940 Sex: female  Age: 79 y.o. Subjective:  Chief Complaint   Patient presents with    Hypertension        HPI     Patient had COVID-19 infection about 2 weeks ago. She first developed a little bit of a cough, she was weak, she was not short of breath and did not become hypoxic when she checked her pulse oximetry. She began to feel little better ,she is no longer coughing, she still little weak. She also noticed epistaxis early in the morning over the last few days  . She was not hospitalized but her  was hospitalized with COVID-19   infection and pneumonia. She has a history of coronary artery disease, hypertension, hypothyroidism and intermittent dizziness. Medications reviewed. No change from previous. She is not going to get the COVID-19 vaccine but she may change her mind within the next 90 days. Review of Systems   Constitutional: Positive for fatigue. Negative for appetite change, fever and unexpected weight change. HENT: Positive for nosebleeds. Negative for congestion, ear pain, facial swelling, rhinorrhea, sore throat, tinnitus and trouble swallowing. Eyes: Negative for photophobia, pain, discharge, itching and visual disturbance. Respiratory: Negative for cough, shortness of breath, wheezing and stridor. Cardiovascular: Negative for chest pain, palpitations and leg swelling. Gastrointestinal: Negative for abdominal pain, anal bleeding, blood in stool, constipation, diarrhea, nausea and vomiting. Endocrine: Negative for cold intolerance, heat intolerance, polydipsia, polyphagia and polyuria. Genitourinary: Negative for difficulty urinating, dysuria, flank pain, frequency, hematuria and urgency. Musculoskeletal: Negative for arthralgias, gait problem, joint swelling and myalgias. Skin: Negative for color change, pallor and rash. Allergic/Immunologic: Negative for environmental allergies and food allergies. Neurological: Negative for dizziness, tremors, seizures, syncope, speech difficulty, weakness, light-headedness, numbness and headaches. Hematological: Negative for adenopathy. Does not bruise/bleed easily. Psychiatric/Behavioral: Negative for agitation, behavioral problems, confusion, sleep disturbance and suicidal ideas. The patient is not nervous/anxious.            Current Outpatient Medications:     metoprolol succinate (TOPROL XL) 50 MG extended release tablet, Take 1 tablet by mouth 2 times daily, Disp: 180 tablet, Rfl: 1    meclizine (ANTIVERT) 25 MG tablet, Take 1 tablet by mouth daily as needed for Dizziness, Disp: 90 tablet, Rfl: 3    levothyroxine (SYNTHROID) 75 MCG tablet, TAKE 1 TABLET BY MOUTH ONCE DAILY -ON  MONDAY,  TAKE 2 TABS, ON WED AND FRIDAYS, TAKE 1.5 TABS, Disp: 150 tablet, Rfl: 3    Cholecalciferol (VITAMIN D3) 50 MCG (2000 UT) CAPS, Take 1 capsule by mouth daily, Disp: 90 capsule, Rfl: 3    atorvastatin (LIPITOR) 80 MG tablet, Take 1 tablet by mouth daily, Disp: 90 tablet, Rfl: 3    BIOTIN MAXIMUM PO, Take by mouth, Disp: , Rfl:     NONFORMULARY, Liver detox OTC (Herbal, probiotic BID,, Disp: , Rfl:     aspirin 81 MG tablet, Take 1 tablet by mouth daily, Disp: 90 tablet, Rfl: 5     Allergies   Allergen Reactions    Adhesive Tape Rash        Past Medical History:   Diagnosis Date    CAD (coronary artery disease)     Colon polyps     GERD (gastroesophageal reflux disease)     Hot flashes due to surgical menopause 7/22/2020    Hyperlipidemia     Hypothyroidism     [facility]anoma ([facility] [address]     Osteopenia        Health Maintenance Due   Topic Date Due    COVID-19 Vaccine (1 of 2) 10[birth year]26/1955    Shingles Vaccine (1 of 2) 10/26/1989    Annual Wellness Visit (AWV)  06/23/2019        Patient Active Problem List   Diagnosis    Essential hypertension    Dizziness    Hypothyroidism    Hyperlipidemia    CAD (coronary artery disease)    [address]oma ([address]    Neck pain on left side    Flu vaccine need    Leg cramps, sleep related    Vitamin D deficiency    Abdominal distention    COVID-19 virus infection    Epistaxis        Past Surgical History:   Procedure Laterality Date     SECTION      CORONARY ARTERY BYPASS GRAFT  2014    LITHOTRIPSY      CHRISTIAN AND BSO          Family History   Problem Relation Age of Onset    Coronary Art Dis Mother     Other Mother         CVA    Heart Attack Father     Pancreatic Cancer Sister     Lung Cancer Sister         Social History     Tobacco Use    Smoking status: Never Smoker    Smokeless tobacco: Never Used   Substance Use Topics    Alcohol use: Never     Frequency: Never    Drug use: Never        Objective  Vitals:    21 1313   BP: 112/62   Site: Right Upper Arm   Position: Sitting   Cuff Size: Medium Adult   Pulse: 77   Temp: 97.6 °F (36.4 °C)   TempSrc: Temporal   SpO2: 97%   Weight: 125 lb (56.7 kg)   Height: 5' 4\" (1.626 m)        Exam:  Physical Exam  Vitals signs reviewed. Constitutional:       General: She is not in acute distress. Appearance: Normal appearance. She is well-developed and normal weight. She is not ill-appearing. HENT:      Head: Normocephalic. Right Ear: External ear normal.      Left Ear: External ear normal.      Nose: Nose normal.   Eyes:      General: No scleral icterus. Right eye: No discharge. Left eye: No discharge. Conjunctiva/sclera: Conjunctivae normal.      Pupils: Pupils are equal, round, and reactive to light. Neck:      Musculoskeletal: Normal range of motion and neck supple. Thyroid: No thyromegaly. Cardiovascular:      Rate and Rhythm: Normal rate and regular rhythm. Heart sounds: Normal heart sounds. No murmur. No friction rub. No gallop. Pulmonary:      Effort: Pulmonary effort is normal. No respiratory distress. Breath sounds: Normal breath sounds. No wheezing or rales. Chest:      Chest wall: No tenderness.    Abdominal: General: Bowel sounds are normal. There is no distension. Palpations: Abdomen is soft. There is no mass. Tenderness: There is no abdominal tenderness. There is no guarding or rebound. Musculoskeletal: Normal range of motion. General: No tenderness or deformity. Lymphadenopathy:      Cervical: No cervical adenopathy. Skin:     General: Skin is warm and dry. Coloration: Skin is not pale. Findings: No erythema or rash. Neurological:      General: No focal deficit present. Mental Status: She is alert and oriented to person, place, and time. Cranial Nerves: No cranial nerve deficit. Sensory: No sensory deficit. Deep Tendon Reflexes: Reflexes normal.   Psychiatric:         Mood and Affect: Mood normal.         Behavior: Behavior normal.         Thought Content: Thought content normal.         Judgment: Judgment normal.          Last labs reviewed. ASSESSMENT & PLAN :   Problem List        Circulatory    Essential hypertension     Blood pressures have been stable so stay on her metoprolol succinate 50 mg twice daily         Relevant Medications    metoprolol succinate (TOPROL XL) 50 MG extended release tablet    Other Relevant Orders    Comprehensive Metabolic Panel    Epistaxis     Check CBC, check pro time/INR to see if she has any underlying coagulopathy. If epistaxis does not resolve I would recommend referral to ENT. She will let me know within the week.   Stop aspirin until her nosebleeds have completely resolved         Relevant Orders    PROTIME-INR       Endocrine    Hypothyroidism     Continue present dose of levothyroxine  Check TSH and free T4 as it has been a while since these were checked         Relevant Medications    levothyroxine (SYNTHROID) 75 MCG tablet    Other Relevant Orders    T4, Free    TSH without Reflex       Other    COVID-19 virus infection - Primary     Rest, continue her vitamin C, vitamin D3 and zinc.  See me in about a month Relevant Orders    CBC Auto Differential           Return in about 1 month (around 2/25/2021).        Sung Montero,   1/25/2021

## 2021-01-26 ENCOUNTER — TELEPHONE (OUTPATIENT)
Dept: PRIMARY CARE CLINIC | Age: 81
End: 2021-01-26

## 2021-01-26 ENCOUNTER — TELEPHONE (OUTPATIENT)
Dept: ADMINISTRATIVE | Age: 81
End: 2021-01-26

## 2021-01-26 DIAGNOSIS — R05.9 COUGH: ICD-10-CM

## 2021-01-26 DIAGNOSIS — U07.1 COVID-19 VIRUS INFECTION: Primary | ICD-10-CM

## 2021-01-26 NOTE — TELEPHONE ENCOUNTER
Pt states she received a call from the office, but could not hear the message. She is asking why the office was calling. Please advise pt.     Thanks

## 2021-01-26 NOTE — TELEPHONE ENCOUNTER
Alexandra Lopez, will send in chest x-ray requisition for 2/8/2021.   She can have it done when she gets her 's chest x-ray

## 2021-01-26 NOTE — TELEPHONE ENCOUNTER
Chest x-ray was ordered for her  Sahil Sanchez for follow-up on his COVID-19 pneumonia. It is to be done around 2/8/2021.   She should have been given a requisition for this , if not let us know and we will send one to the hospital

## 2021-01-28 ENCOUNTER — TELEPHONE (OUTPATIENT)
Dept: ADMINISTRATIVE | Age: 81
End: 2021-01-28

## 2021-03-04 ENCOUNTER — OFFICE VISIT (OUTPATIENT)
Dept: PRIMARY CARE CLINIC | Age: 81
End: 2021-03-04
Payer: MEDICARE

## 2021-03-04 VITALS
BODY MASS INDEX: 21.46 KG/M2 | DIASTOLIC BLOOD PRESSURE: 88 MMHG | SYSTOLIC BLOOD PRESSURE: 160 MMHG | HEART RATE: 65 BPM | WEIGHT: 125 LBS | TEMPERATURE: 97.5 F | OXYGEN SATURATION: 97 %

## 2021-03-04 DIAGNOSIS — Z23 FLU VACCINE NEED: ICD-10-CM

## 2021-03-04 DIAGNOSIS — I10 ESSENTIAL HYPERTENSION: ICD-10-CM

## 2021-03-04 DIAGNOSIS — E03.9 ACQUIRED HYPOTHYROIDISM: ICD-10-CM

## 2021-03-04 DIAGNOSIS — I25.10 CORONARY ARTERY DISEASE INVOLVING NATIVE CORONARY ARTERY OF NATIVE HEART WITHOUT ANGINA PECTORIS: ICD-10-CM

## 2021-03-04 DIAGNOSIS — E78.2 MIXED HYPERLIPIDEMIA: ICD-10-CM

## 2021-03-04 DIAGNOSIS — G47.62 LEG CRAMPS, SLEEP RELATED: ICD-10-CM

## 2021-03-04 DIAGNOSIS — R04.0 EPISTAXIS: Primary | ICD-10-CM

## 2021-03-04 PROCEDURE — 99213 OFFICE O/P EST LOW 20 MIN: CPT | Performed by: INTERNAL MEDICINE

## 2021-03-04 ASSESSMENT — ENCOUNTER SYMPTOMS
PHOTOPHOBIA: 0
DIARRHEA: 0
COUGH: 0
BLOOD IN STOOL: 0
ABDOMINAL PAIN: 0
CONSTIPATION: 0
TROUBLE SWALLOWING: 0
RHINORRHEA: 0
EYE ITCHING: 0
STRIDOR: 0
VOMITING: 0
NAUSEA: 0
FACIAL SWELLING: 0
EYE DISCHARGE: 0
COLOR CHANGE: 0
SORE THROAT: 0
EYE PAIN: 0
ANAL BLEEDING: 0
SHORTNESS OF BREATH: 0
WHEEZING: 0

## 2021-03-04 NOTE — ASSESSMENT & PLAN NOTE
Use Ocean Spray at night and in the morning in her nostrils.   Try coolmist vaporizer in the bedroom

## 2021-03-04 NOTE — ASSESSMENT & PLAN NOTE
Blood pressures are stable. Continue medications and monitor blood pressures at home. Call office if systolics are over 842 over diastolics over 90.

## 2021-03-04 NOTE — ASSESSMENT & PLAN NOTE
Needs to make an appointment to see her cardiologist Dr. Jose Cruz in [place]. In the meantime stay on her medications.   She has no angina

## 2021-03-04 NOTE — ASSESSMENT & PLAN NOTE
Continue atorvastatin 80 mg at bedtime.   Watch fats in diet and will check lipids next office visit

## 2021-03-04 NOTE — PROGRESS NOTES
3/4/2021    Name: Karen Hoyt : 1940 Sex: female  Age: 79 y.o. Subjective:  Chief Complaint   Patient presents with    1 Month Follow-Up        HPI     Patient had COVID-19 infection   She first developed a little bit of a cough, she was weak, she was not short of breath and did not become hypoxic when she checked her pulse oximetry. She began to feel little better ,she is no longer coughing, she still little weak. She also noticed epistaxis early in the morning over the last few days  . She was not hospitalized but her  was hospitalized with COVID-19   infection and pneumonia. Chest x-ray showed no infiltrates    She complains of occasional epistaxis mainly in the morning, self terminating, not bad. She is using normal saline spray in her nostrils and this has improved. She has a history of coronary artery disease, hypertension, hypothyroidism and intermittent dizziness. Medications reviewed. No change from previous. Last blood work showed an estimated GFR of 53 with a BUN of 26 and a creatinine of 1. . We need to check lipids on her in the future as last  was in 2020 were good. She is continues on atorvastatin  Alkaline phosphatase was 114. TSH and free T4 were normal.    She is not going to get the COVID-19 vaccine but she may change her mind within the next 90 days. Review of Systems   Constitutional: Positive for fatigue. Negative for appetite change, fever and unexpected weight change. HENT: Positive for nosebleeds. Negative for congestion, ear pain, facial swelling, rhinorrhea, sore throat, tinnitus and trouble swallowing. Eyes: Negative for photophobia, pain, discharge, itching and visual disturbance. Respiratory: Negative for cough, shortness of breath, wheezing and stridor. Cardiovascular: Negative for chest pain, palpitations and leg swelling.    Gastrointestinal: Negative for abdominal pain, anal bleeding, blood in stool, constipation, diarrhea, nausea and vomiting. Endocrine: Negative for cold intolerance, heat intolerance, polydipsia, polyphagia and polyuria. Genitourinary: Negative for difficulty urinating, dysuria, flank pain, frequency, hematuria and urgency. Musculoskeletal: Negative for arthralgias, gait problem, joint swelling and myalgias. Skin: Negative for color change, pallor and rash. Allergic/Immunologic: Negative for environmental allergies and food allergies. Neurological: Negative for dizziness, tremors, seizures, syncope, speech difficulty, weakness, light-headedness, numbness and headaches. Hematological: Negative for adenopathy. Does not bruise/bleed easily. Psychiatric/Behavioral: Negative for agitation, behavioral problems, confusion, sleep disturbance and suicidal ideas. The patient is not nervous/anxious.            Current Outpatient Medications:     metoprolol succinate (TOPROL XL) 50 MG extended release tablet, Take 1 tablet by mouth 2 times daily, Disp: 180 tablet, Rfl: 1    meclizine (ANTIVERT) 25 MG tablet, Take 1 tablet by mouth daily as needed for Dizziness, Disp: 90 tablet, Rfl: 3    levothyroxine (SYNTHROID) 75 MCG tablet, TAKE 1 TABLET BY MOUTH ONCE DAILY -ON  MONDAY,  TAKE 2 TABS, ON WED AND FRIDAYS, TAKE 1.5 TABS, Disp: 150 tablet, Rfl: 3    Cholecalciferol (VITAMIN D3) 50 MCG (2000 UT) CAPS, Take 1 capsule by mouth daily, Disp: 90 capsule, Rfl: 3    atorvastatin (LIPITOR) 80 MG tablet, Take 1 tablet by mouth daily, Disp: 90 tablet, Rfl: 3    BIOTIN MAXIMUM PO, Take by mouth, Disp: , Rfl:     NONFORMULARY, Liver detox OTC (Herbal, probiotic BID,, Disp: , Rfl:     aspirin 81 MG tablet, Take 1 tablet by mouth daily, Disp: 90 tablet, Rfl: 5     Allergies   Allergen Reactions    Adhesive Tape Rash        Past Medical History:   Diagnosis Date    CAD (coronary artery disease)     Colon polyps     Flu vaccine need 10/21/2019    GERD (gastroesophageal reflux disease)     Hot flashes due to surgical menopause 2020    Hyperlipidemia     Hypothyroidism     Leg cramps, sleep related 2020    [facility] ([facility] [address]     Osteopenia        Health Maintenance Due   Topic Date Due    COVID-19 Vaccine (1 of 2) Never done    Shingles Vaccine (1 of 2) Never done   [employer] Visit (AWV)  Never done        Patient Active Problem List   Diagnosis    Essential hypertension    Dizziness    Hypothyroidism    Hyperlipidemia    CAD (coronary artery disease)    [facility] ([facility] [address]    Neck pain on left side    Vitamin D deficiency    Abdominal distention    COVID-19 virus infection    Epistaxis        Past Surgical History:   Procedure Laterality Date     SECTION      CORONARY ARTERY BYPASS GRAFT  2014    LITHOTRIPSY      CHRISTIAN AND BSO          Family History   Problem Relation Age of Onset    Coronary Art Dis Mother     Other Mother         CVA    Heart Attack Father     Pancreatic Cancer Sister     Lung Cancer Sister         Social History     Tobacco Use    Smoking status: Never Smoker    Smokeless tobacco: Never Used   Substance Use Topics    Alcohol use: Never     Frequency: Never    Drug use: Never        Objective  Vitals:    21 1507 21 1508   BP: (!) 160/88 (!) 160/88   Pulse: 65    Temp: 97.5 °F (36.4 °C)    TempSrc: Temporal    SpO2: 97%    Weight: 125 lb (56.7 kg)         Exam:  Physical Exam  Vitals signs reviewed. Constitutional:       General: She is not in acute distress. Appearance: Normal appearance. She is well-developed and normal weight. She is not ill-appearing. HENT:      Head: Normocephalic. Right Ear: External ear normal.      Left Ear: External ear normal.      Nose: Nose normal.   Eyes:      General: No scleral icterus. Right eye: No discharge. Left eye: No discharge. Conjunctiva/sclera: Conjunctivae normal.      Pupils: Pupils are equal, round, and reactive to light.    Neck:      Musculoskeletal: Normal range of motion and neck supple. Thyroid: No thyromegaly. Cardiovascular:      Rate and Rhythm: Normal rate and regular rhythm. Heart sounds: Normal heart sounds. No murmur. No friction rub. No gallop. Pulmonary:      Effort: Pulmonary effort is normal. No respiratory distress. Breath sounds: Normal breath sounds. No wheezing or rales. Chest:      Chest wall: No tenderness. Abdominal:      General: Bowel sounds are normal. There is no distension. Palpations: Abdomen is soft. There is no mass. Tenderness: There is no abdominal tenderness. There is no guarding or rebound. Musculoskeletal: Normal range of motion. General: No tenderness or deformity. Lymphadenopathy:      Cervical: No cervical adenopathy. Skin:     General: Skin is warm and dry. Coloration: Skin is not pale. Findings: No erythema or rash. Neurological:      General: No focal deficit present. Mental Status: She is alert and oriented to person, place, and time. Cranial Nerves: No cranial nerve deficit. Sensory: No sensory deficit. Deep Tendon Reflexes: Reflexes normal.   Psychiatric:         Mood and Affect: Mood normal.         Behavior: Behavior normal.         Thought Content: Thought content normal.         Judgment: Judgment normal.          Last labs reviewed. ASSESSMENT & PLAN :   Problem List        Circulatory    Essential hypertension     Blood pressures are stable. Continue medications and monitor blood pressures at home. Call office if systolics are over 454 over diastolics over 90. Relevant Medications    metoprolol succinate (TOPROL XL) 50 MG extended release tablet    CAD (coronary artery disease)     Needs to make an appointment to see her cardiologist Dr. Honey Frankel in [place]. In the meantime stay on her medications.   She has no angina         Relevant Medications    aspirin 81 MG tablet    metoprolol succinate (TOPROL XL) 50 MG extended release tablet    atorvastatin (LIPITOR) 80 MG tablet    Epistaxis - Primary     Use Ocean Spray at night and in the morning in her nostrils. Try coolmist vaporizer in the bedroom            Endocrine    Hypothyroidism     Continue present dose of levothyroxine. Will check TSH and free T4 on her next office visit         Relevant Medications    levothyroxine (SYNTHROID) 75 MCG tablet       Other    Hyperlipidemia     Continue atorvastatin 80 mg at bedtime. Watch fats in diet and will check lipids next office visit         Relevant Medications    aspirin 81 MG tablet    metoprolol succinate (TOPROL XL) 50 MG extended release tablet    atorvastatin (LIPITOR) 80 MG tablet    RESOLVED: Flu vaccine need    RESOLVED: Leg cramps, sleep related           Return in about 3 months (around 6/4/2021), or awv and hypohyroidism be fasting.        Sung Montero,   3/4/2021

## 2021-06-14 ENCOUNTER — OFFICE VISIT (OUTPATIENT)
Dept: PRIMARY CARE CLINIC | Age: 81
End: 2021-06-14
Payer: MEDICARE

## 2021-06-14 VITALS
TEMPERATURE: 97.6 F | SYSTOLIC BLOOD PRESSURE: 130 MMHG | WEIGHT: 122 LBS | HEIGHT: 64 IN | HEART RATE: 69 BPM | DIASTOLIC BLOOD PRESSURE: 70 MMHG | OXYGEN SATURATION: 97 % | BODY MASS INDEX: 20.83 KG/M2

## 2021-06-14 DIAGNOSIS — E55.9 VITAMIN D DEFICIENCY: ICD-10-CM

## 2021-06-14 DIAGNOSIS — I10 ESSENTIAL HYPERTENSION: ICD-10-CM

## 2021-06-14 DIAGNOSIS — I10 ESSENTIAL HYPERTENSION: Primary | ICD-10-CM

## 2021-06-14 DIAGNOSIS — I25.10 CORONARY ARTERY DISEASE INVOLVING NATIVE CORONARY ARTERY OF NATIVE HEART WITHOUT ANGINA PECTORIS: ICD-10-CM

## 2021-06-14 DIAGNOSIS — E03.9 ACQUIRED HYPOTHYROIDISM: ICD-10-CM

## 2021-06-14 DIAGNOSIS — E78.2 MIXED HYPERLIPIDEMIA: ICD-10-CM

## 2021-06-14 DIAGNOSIS — R53.82 CHRONIC FATIGUE: ICD-10-CM

## 2021-06-14 PROBLEM — R04.0 EPISTAXIS: Status: RESOLVED | Noted: 2021-01-25 | Resolved: 2021-06-14

## 2021-06-14 LAB
BASOPHILS ABSOLUTE: 0.03 E9/L (ref 0–0.2)
BASOPHILS RELATIVE PERCENT: 0.4 % (ref 0–2)
EOSINOPHILS ABSOLUTE: 0.23 E9/L (ref 0.05–0.5)
EOSINOPHILS RELATIVE PERCENT: 3.3 % (ref 0–6)
HCT VFR BLD CALC: 42.2 % (ref 34–48)
HEMOGLOBIN: 12.9 G/DL (ref 11.5–15.5)
IMMATURE GRANULOCYTES #: 0.03 E9/L
IMMATURE GRANULOCYTES %: 0.4 % (ref 0–5)
LYMPHOCYTES ABSOLUTE: 1.49 E9/L (ref 1.5–4)
LYMPHOCYTES RELATIVE PERCENT: 21.4 % (ref 20–42)
MCH RBC QN AUTO: 30.6 PG (ref 26–35)
MCHC RBC AUTO-ENTMCNC: 30.6 % (ref 32–34.5)
MCV RBC AUTO: 100.2 FL (ref 80–99.9)
MONOCYTES ABSOLUTE: 0.78 E9/L (ref 0.1–0.95)
MONOCYTES RELATIVE PERCENT: 11.2 % (ref 2–12)
NEUTROPHILS ABSOLUTE: 4.4 E9/L (ref 1.8–7.3)
NEUTROPHILS RELATIVE PERCENT: 63.3 % (ref 43–80)
PDW BLD-RTO: 12.7 FL (ref 11.5–15)
PLATELET # BLD: 218 E9/L (ref 130–450)
PMV BLD AUTO: 10.5 FL (ref 7–12)
RBC # BLD: 4.21 E12/L (ref 3.5–5.5)
WBC # BLD: 7 E9/L (ref 4.5–11.5)

## 2021-06-14 PROCEDURE — 99214 OFFICE O/P EST MOD 30 MIN: CPT | Performed by: INTERNAL MEDICINE

## 2021-06-14 RX ORDER — ATORVASTATIN CALCIUM 80 MG/1
80 TABLET, FILM COATED ORAL DAILY
Qty: 90 TABLET | Refills: 3 | Status: SHIPPED
Start: 2021-06-14 | End: 2022-06-01

## 2021-06-14 RX ORDER — LEVOTHYROXINE SODIUM 0.07 MG/1
TABLET ORAL
Qty: 150 TABLET | Refills: 3 | Status: SHIPPED
Start: 2021-06-14 | End: 2021-11-01 | Stop reason: SDUPTHER

## 2021-06-14 RX ORDER — METOPROLOL SUCCINATE 50 MG/1
50 TABLET, EXTENDED RELEASE ORAL 2 TIMES DAILY
Qty: 180 TABLET | Refills: 1 | Status: SHIPPED
Start: 2021-06-14 | End: 2021-09-13

## 2021-06-14 RX ORDER — ACETAMINOPHEN 160 MG
1 TABLET,DISINTEGRATING ORAL DAILY
Qty: 90 CAPSULE | Refills: 3 | Status: SHIPPED
Start: 2021-06-14 | End: 2022-09-21 | Stop reason: SDUPTHER

## 2021-06-14 RX ORDER — MECLIZINE HYDROCHLORIDE 25 MG/1
25 TABLET ORAL DAILY PRN
Qty: 90 TABLET | Refills: 3 | Status: SHIPPED
Start: 2021-06-14 | End: 2022-09-21 | Stop reason: SDUPTHER

## 2021-06-14 SDOH — ECONOMIC STABILITY: FOOD INSECURITY: WITHIN THE PAST 12 MONTHS, THE FOOD YOU BOUGHT JUST DIDN'T LAST AND YOU DIDN'T HAVE MONEY TO GET MORE.: NEVER TRUE

## 2021-06-14 SDOH — ECONOMIC STABILITY: FOOD INSECURITY: WITHIN THE PAST 12 MONTHS, YOU WORRIED THAT YOUR FOOD WOULD RUN OUT BEFORE YOU GOT MONEY TO BUY MORE.: NEVER TRUE

## 2021-06-14 ASSESSMENT — ENCOUNTER SYMPTOMS
FACIAL SWELLING: 0
BLOOD IN STOOL: 0
STRIDOR: 0
PHOTOPHOBIA: 0
ANAL BLEEDING: 0
VOMITING: 0
CONSTIPATION: 0
EYE PAIN: 0
TROUBLE SWALLOWING: 0
SORE THROAT: 0
DIARRHEA: 0
RHINORRHEA: 0
NAUSEA: 0
EYE ITCHING: 0
EYE DISCHARGE: 0
WHEEZING: 0
ABDOMINAL PAIN: 0
COLOR CHANGE: 0
COUGH: 0
SHORTNESS OF BREATH: 0

## 2021-06-14 ASSESSMENT — SOCIAL DETERMINANTS OF HEALTH (SDOH): HOW HARD IS IT FOR YOU TO PAY FOR THE VERY BASICS LIKE FOOD, HOUSING, MEDICAL CARE, AND HEATING?: NOT HARD AT ALL

## 2021-06-14 NOTE — PROGRESS NOTES
Gastrointestinal: Negative for abdominal pain, anal bleeding, blood in stool, constipation, diarrhea, nausea and vomiting. Endocrine: Negative for cold intolerance, heat intolerance, polydipsia, polyphagia and polyuria. Genitourinary: Negative for difficulty urinating, dysuria, flank pain, frequency, hematuria and urgency. Musculoskeletal: Negative for arthralgias, gait problem, joint swelling and myalgias. Skin: Negative for color change, pallor and rash. Allergic/Immunologic: Negative for environmental allergies and food allergies. Neurological: Negative for dizziness, tremors, seizures, syncope, speech difficulty, weakness, light-headedness, numbness and headaches. Hematological: Negative for adenopathy. Does not bruise/bleed easily. Psychiatric/Behavioral: Negative for agitation, behavioral problems, confusion, sleep disturbance and suicidal ideas. The patient is not nervous/anxious.            Current Outpatient Medications:     atorvastatin (LIPITOR) 80 MG tablet, Take 1 tablet by mouth daily, Disp: 90 tablet, Rfl: 3    Cholecalciferol (VITAMIN D3) 50 MCG (2000 UT) CAPS, Take 1 capsule by mouth daily, Disp: 90 capsule, Rfl: 3    levothyroxine (SYNTHROID) 75 MCG tablet, TAKE 1 TABLET BY MOUTH ONCE DAILY -ON  MONDAY,  TAKE 2 TABS, ON WED AND FRIDAYS, TAKE 1.5 TABS, Disp: 150 tablet, Rfl: 3    meclizine (ANTIVERT) 25 MG tablet, Take 1 tablet by mouth daily as needed for Dizziness, Disp: 90 tablet, Rfl: 3    metoprolol succinate (TOPROL XL) 50 MG extended release tablet, Take 1 tablet by mouth 2 times daily, Disp: 180 tablet, Rfl: 1    BIOTIN MAXIMUM PO, Take by mouth, Disp: , Rfl:     NONFORMULARY, Liver detox OTC (Herbal, probiotic BID,, Disp: , Rfl:     aspirin 81 MG tablet, Take 1 tablet by mouth daily, Disp: 90 tablet, Rfl: 5     Allergies   Allergen Reactions    Adhesive Tape Rash        Past Medical History:   Diagnosis Date    CAD (coronary artery disease)     Colon polyps     Flu vaccine need 10/21/2019    GERD (gastroesophageal reflux disease)     Hot flashes due to surgical menopause 2020    Hyperlipidemia     Hypothyroidism     Leg cramps, sleep related 2020    [facility] ([facility] [address]     Osteopenia        Health Maintenance Due   Topic Date Due    COVID-19 Vaccine (1) Never done    Shingles Vaccine (1 of 2) Never done   [employer] Visit (AWV)  Never done        Patient Active Problem List   Diagnosis    Essential hypertension    Dizziness    Hypothyroidism    Hyperlipidemia    CAD (coronary artery disease)    [facility]anoma ([facility] [address]    Neck pain on left side    Vitamin D deficiency    Abdominal distention    COVID-19 virus infection    Chronic fatigue        Past Surgical History:   Procedure Laterality Date     SECTION     Jo Ojeda CORONARY ARTERY BYPASS GRAFT  2014    LITHOTRIPSY      CHRISTIAN AND BSO          Family History   Problem Relation Age of Onset    Coronary Art Dis Mother     Other Mother         CVA    Heart Attack Father     Pancreatic Cancer Sister     Lung Cancer Sister         Social History     Tobacco Use    Smoking status: Never Smoker    Smokeless tobacco: Never Used   Substance Use Topics    Alcohol use: Never    Drug use: Never        Objective  Vitals:    21 0830   BP: 130/70   Site: Right Upper Arm   Position: Sitting   Cuff Size: Medium Adult   Pulse: 69   Temp: 97.6 °F (36.4 °C)   TempSrc: Temporal   SpO2: 97%   Weight: 122 lb (55.3 kg)   Height: 5' 4\" (1.626 m)        Exam:  Physical Exam  Vitals reviewed. Constitutional:       General: She is not in acute distress. Appearance: Normal appearance. She is well-developed and normal weight. She is not ill-appearing. HENT:      Head: Normocephalic. Right Ear: External ear normal.      Left Ear: External ear normal.      Nose: Nose normal.   Eyes:      General: No scleral icterus. Right eye: No discharge. Left eye: No discharge. Conjunctiva/sclera: Conjunctivae normal.      Pupils: Pupils are equal, round, and reactive to light. Neck:      Thyroid: No thyromegaly. Cardiovascular:      Rate and Rhythm: Normal rate and regular rhythm. Heart sounds: Normal heart sounds. No murmur heard. No friction rub. No gallop. Pulmonary:      Effort: Pulmonary effort is normal. No respiratory distress. Breath sounds: Normal breath sounds. No wheezing or rales. Chest:      Chest wall: No tenderness. Abdominal:      General: Bowel sounds are normal. There is no distension. Palpations: Abdomen is soft. There is no mass. Tenderness: There is no abdominal tenderness. There is no guarding or rebound. Musculoskeletal:         General: No tenderness or deformity. Normal range of motion. Cervical back: Normal range of motion and neck supple. Lymphadenopathy:      Cervical: No cervical adenopathy. Skin:     General: Skin is warm and dry. Coloration: Skin is not pale. Findings: No erythema or rash. Neurological:      General: No focal deficit present. Mental Status: She is alert and oriented to person, place, and time. Cranial Nerves: No cranial nerve deficit. Sensory: No sensory deficit. Deep Tendon Reflexes: Reflexes normal.   Psychiatric:         Mood and Affect: Mood normal.         Behavior: Behavior normal.         Thought Content: Thought content normal.         Judgment: Judgment normal.          Last labs reviewed. ASSESSMENT & PLAN :   Problem List        Circulatory    Essential hypertension - Primary     Blood pressures are stable. Continue medications and monitor blood pressures at home. Call office if systolics are over 979 over diastolics over 90.           Relevant Medications    metoprolol succinate (TOPROL XL) 50 MG extended release tablet    Other Relevant Orders    Comprehensive Metabolic Panel    CAD (coronary artery disease)       no angina, follow-up with cardiology         Relevant Medications    aspirin 81 MG tablet    atorvastatin (LIPITOR) 80 MG tablet    metoprolol succinate (TOPROL XL) 50 MG extended release tablet    Other Relevant Orders    Lipid Panel       Endocrine    Hypothyroidism       check TSH and free T4 and continue levothyroxine 75 mcg a day until results are back. Adjust medications if necessary         Relevant Medications    levothyroxine (SYNTHROID) 75 MCG tablet    Other Relevant Orders    T4, Free    TSH without Reflex       Other    Hyperlipidemia               Relevant Medications    aspirin 81 MG tablet    atorvastatin (LIPITOR) 80 MG tablet    metoprolol succinate (TOPROL XL) 50 MG extended release tablet    Other Relevant Orders    Lipid Panel    Vitamin D deficiency       continue over-the-counter vitamin D3         Relevant Medications    Cholecalciferol (VITAMIN D3) 50 MCG (2000 UT) CAPS    Chronic fatigue       may be secondary to her  COVID-19 infection. We will recheck CBC and also her TSH free T4 and CMP. This may improve with time. Relevant Orders    CBC Auto Differential           Return in about 3 months (around 9/14/2021), or AWV and hypothroidism.        Rachell Chávez, DO  6/14/2021

## 2021-06-14 NOTE — ASSESSMENT & PLAN NOTE
check TSH and free T4 and continue levothyroxine 75 mcg a day until results are back.   Adjust medications if necessary

## 2021-06-14 NOTE — ASSESSMENT & PLAN NOTE
Blood pressures are stable. Continue medications and monitor blood pressures at home. Call office if systolics are over [phone] over diastolics over 90.

## 2021-06-15 ENCOUNTER — TELEPHONE (OUTPATIENT)
Dept: PRIMARY CARE CLINIC | Age: 81
End: 2021-06-15

## 2021-06-15 DIAGNOSIS — R53.82 CHRONIC FATIGUE: Primary | ICD-10-CM

## 2021-06-15 LAB
ALBUMIN SERPL-MCNC: 4.6 G/DL (ref 3.5–5.2)
ALP BLD-CCNC: 101 U/L (ref 35–104)
ALT SERPL-CCNC: 19 U/L (ref 0–32)
ANION GAP SERPL CALCULATED.3IONS-SCNC: 16 MMOL/L (ref 7–16)
AST SERPL-CCNC: 23 U/L (ref 0–31)
BILIRUB SERPL-MCNC: 0.4 MG/DL (ref 0–1.2)
BUN BLDV-MCNC: 20 MG/DL (ref 6–23)
CALCIUM SERPL-MCNC: 10.2 MG/DL (ref 8.6–10.2)
CHLORIDE BLD-SCNC: 104 MMOL/L (ref 98–107)
CHOLESTEROL, TOTAL: 123 MG/DL (ref 0–199)
CO2: 22 MMOL/L (ref 22–29)
CREAT SERPL-MCNC: 0.9 MG/DL (ref 0.5–1)
GFR AFRICAN AMERICAN: >60
GFR NON-AFRICAN AMERICAN: 60 ML/MIN/1.73
GLUCOSE BLD-MCNC: 87 MG/DL (ref 74–99)
HDLC SERPL-MCNC: 35 MG/DL
LDL CHOLESTEROL CALCULATED: 61 MG/DL (ref 0–99)
POTASSIUM SERPL-SCNC: 5.1 MMOL/L (ref 3.5–5)
SODIUM BLD-SCNC: 142 MMOL/L (ref 132–146)
T4 FREE: 1.93 NG/DL (ref 0.93–1.7)
TOTAL PROTEIN: 7.6 G/DL (ref 6.4–8.3)
TRIGL SERPL-MCNC: 136 MG/DL (ref 0–149)
TSH SERPL DL<=0.05 MIU/L-ACNC: 2.09 UIU/ML (ref 0.27–4.2)
VLDLC SERPL CALC-MCNC: 27 MG/DL

## 2021-09-12 DIAGNOSIS — I10 ESSENTIAL HYPERTENSION: ICD-10-CM

## 2021-09-13 RX ORDER — METOPROLOL SUCCINATE 50 MG/1
TABLET, EXTENDED RELEASE ORAL
Qty: 180 TABLET | Refills: 0 | Status: SHIPPED
Start: 2021-09-13 | End: 2021-09-20 | Stop reason: SDUPTHER

## 2021-09-20 ENCOUNTER — OFFICE VISIT (OUTPATIENT)
Dept: PRIMARY CARE CLINIC | Age: 81
End: 2021-09-20
Payer: MEDICARE

## 2021-09-20 VITALS
OXYGEN SATURATION: 97 % | HEART RATE: 60 BPM | WEIGHT: 125 LBS | DIASTOLIC BLOOD PRESSURE: 72 MMHG | SYSTOLIC BLOOD PRESSURE: 132 MMHG | HEIGHT: 64 IN | BODY MASS INDEX: 21.34 KG/M2 | TEMPERATURE: 97.7 F

## 2021-09-20 VITALS — HEIGHT: 63 IN | WEIGHT: 125 LBS | BODY MASS INDEX: 22.15 KG/M2

## 2021-09-20 DIAGNOSIS — Z00.00 ROUTINE GENERAL MEDICAL EXAMINATION AT A HEALTH CARE FACILITY: Primary | ICD-10-CM

## 2021-09-20 DIAGNOSIS — E78.2 MIXED HYPERLIPIDEMIA: ICD-10-CM

## 2021-09-20 DIAGNOSIS — Z78.0 MENOPAUSE: ICD-10-CM

## 2021-09-20 DIAGNOSIS — I10 ESSENTIAL HYPERTENSION: Primary | ICD-10-CM

## 2021-09-20 DIAGNOSIS — E03.9 ACQUIRED HYPOTHYROIDISM: ICD-10-CM

## 2021-09-20 DIAGNOSIS — I25.10 CORONARY ARTERY DISEASE INVOLVING NATIVE CORONARY ARTERY OF NATIVE HEART WITHOUT ANGINA PECTORIS: ICD-10-CM

## 2021-09-20 PROCEDURE — 99213 OFFICE O/P EST LOW 20 MIN: CPT | Performed by: INTERNAL MEDICINE

## 2021-09-20 PROCEDURE — G0439 PPPS, SUBSEQ VISIT: HCPCS | Performed by: INTERNAL MEDICINE

## 2021-09-20 RX ORDER — MULTIVIT WITH MINERALS/LUTEIN
250 TABLET ORAL DAILY
COMMUNITY

## 2021-09-20 RX ORDER — METOPROLOL SUCCINATE 50 MG/1
TABLET, EXTENDED RELEASE ORAL
Qty: 180 TABLET | Refills: 3 | Status: SHIPPED
Start: 2021-09-20 | End: 2021-12-13

## 2021-09-20 RX ORDER — ZINC GLUCONATE 50 MG
50 TABLET ORAL DAILY
COMMUNITY

## 2021-09-20 ASSESSMENT — ENCOUNTER SYMPTOMS
EYE DISCHARGE: 0
BLOOD IN STOOL: 0
EYE PAIN: 0
ABDOMINAL DISTENTION: 1
DIARRHEA: 0
VOMITING: 0
TROUBLE SWALLOWING: 0
NAUSEA: 0
RHINORRHEA: 0
ANAL BLEEDING: 0
ABDOMINAL PAIN: 0
COUGH: 0
EYE ITCHING: 0
FACIAL SWELLING: 0
WHEEZING: 0
SORE THROAT: 0
SHORTNESS OF BREATH: 0
BACK PAIN: 1
CONSTIPATION: 0
STRIDOR: 0
PHOTOPHOBIA: 0
COLOR CHANGE: 0

## 2021-09-20 ASSESSMENT — PATIENT HEALTH QUESTIONNAIRE - PHQ9
SUM OF ALL RESPONSES TO PHQ QUESTIONS 1-9: 0
2. FEELING DOWN, DEPRESSED OR HOPELESS: 0
SUM OF ALL RESPONSES TO PHQ9 QUESTIONS 1 & 2: 0
1. LITTLE INTEREST OR PLEASURE IN DOING THINGS: 0
SUM OF ALL RESPONSES TO PHQ QUESTIONS 1-9: 0
SUM OF ALL RESPONSES TO PHQ QUESTIONS 1-9: 0

## 2021-09-20 ASSESSMENT — LIFESTYLE VARIABLES: HOW OFTEN DO YOU HAVE A DRINK CONTAINING ALCOHOL: 0

## 2021-09-20 NOTE — PATIENT INSTRUCTIONS
Personalized Preventive Plan for [facility] - 9/20/2021  Medicare offers a range of preventive health benefits. Some of the tests and screenings are paid in full while other may be subject to a deductible, co-insurance, and/or copay. Some of these benefits include a comprehensive review of your medical history including lifestyle, illnesses that may run in your family, and various assessments and screenings as appropriate. After reviewing your medical record and screening and assessments performed today your provider may have ordered immunizations, labs, imaging, and/or referrals for you. A list of these orders (if applicable) as well as your Preventive Care list are included within your After Visit Summary for your review. Other Preventive Recommendations:    · A preventive eye exam performed by an eye specialist is recommended every 1-2 years to screen for glaucoma; cataracts, macular degeneration, and other eye disorders. · A preventive dental visit is recommended every 6 months. · Try to get at least 150 minutes of exercise per week or 10,000 steps per day on a pedometer . · Order or download the FREE \"Exercise & Physical Activity: Your Everyday Guide\" from [employer]. Call 5-284.647.1215 or search [employer] online. · You need 9667-9911 mg of calcium and 7034-0094 IU of vitamin D per day. It is possible to meet your calcium requirement with diet alone, but a vitamin D supplement is usually necessary to meet this goal.  · When exposed to the sun, use a sunscreen that protects against both UVA and UVB radiation with an SPF of 30 or greater. Reapply every 2 to 3 hours or after sweating, drying off with a towel, or swimming. · Always wear a seat belt when traveling in a car. Always wear a helmet when riding a bicycle or motorcycle.

## 2021-09-20 NOTE — PROGRESS NOTES
Medicare Annual Wellness Visit  Name: Rashid Burkett Date: 2021   MRN: <[id number]> Sex: Female   Age: 79 y.o. Ethnicity: Non- / Non    : 1940 Race: White (non-)      [facility] is here for Medicare AWV    Screenings for behavioral, psychosocial and functional/safety risks, and cognitive dysfunction are all negative except as indicated below. These results, as well as other patient data from the [address] form, are documented in Flowsheets linked to this Encounter. Allergies   Allergen Reactions    Adhesive Tape Rash       Prior to Visit Medications    Medication Sig Taking?  Authorizing Provider   Ascorbic Acid (VITAMIN C) 250 MG tablet Take 250 mg by mouth daily Yes Historical Provider, MD   zinc gluconate 50 MG tablet Take 50 mg by mouth daily Yes Historical Provider, MD   metoprolol succinate (TOPROL XL) 50 MG extended release tablet Take 1 tablet by mouth twice daily Yes Aditi Bailey DO   atorvastatin (LIPITOR) 80 MG tablet Take 1 tablet by mouth daily Yes Aditi Bailey DO   Cholecalciferol (VITAMIN D3) 50 MCG (2000) CAPS Take 1 capsule by mouth daily Yes Aditi Bailey DO   levothyroxine (SYNTHROID) 75 MCG tablet TAKE 1 TABLET BY MOUTH ONCE DAILY -ON  MONDAY,  TAKE 2 TABS, ON WED AND , TAKE 1.5 TABS Yes Aditi Bailey DO   meclizine (ANTIVERT) 25 MG tablet Take 1 tablet by mouth daily as needed for Dizziness Yes Aditi Bailey DO   BIOTIN MAXIMUM PO Take by mouth Yes Historical Provider, MD   NONFORMULARY Liver detox OTC (Herbal, probiotic BID, Yes Historical Provider, MD   aspirin 81 MG tablet Take 1 tablet by mouth daily Yes Octavio Almaguer DO       Past Medical History:   Diagnosis Date    CAD (coronary artery disease)     Colon polyps     Flu vaccine need 10/21/2019    GERD (gastroesophageal reflux disease)     Hot flashes due to surgical menopause 2020    Hyperlipidemia     Hypothyroidism     Leg cramps, sleep related 2020    [facility] ([facility] [address]     Osteopenia        Past Surgical History:   Procedure Laterality Date     SECTION      CORONARY ARTERY BYPASS GRAFT  2014    LITHOTRIPSY      CHRISTIAN AND BSO         Family History   Problem Relation Age of Onset    Coronary Art Dis Mother     Other Mother         CVA    Heart Attack Father     Pancreatic Cancer Sister     Lung Cancer Sister        CareTeam (Including outside providers/suppliers regularly involved in providing care):   Patient Care Team:  Delmis Hung DO as PCP - General (Internal Medicine)  Delmis Hung DO as PCP - [facility] Empaneled Provider    Wt Readings from Last 3 Encounters:   21 125 lb (56.7 kg)   21 125 lb (56.7 kg)   21 122 lb (55.3 kg)     Vitals:    21 0958   Weight: 125 lb (56.7 kg)   Height: 5' 3\" (1.6 m)     Body mass index is 22.14 kg/m². Based upon direct observation of the patient, evaluation of cognition reveals recent and remote memory intact. Patient's complete Health Risk Assessment and screening values have been reviewed and are found in Flowsheets. The following problems were reviewed today and where indicated follow up appointments were made and/or referrals ordered. Positive Risk Factor Screenings with Interventions:          General Health and ACP:  General  In general, how would you say your health is?: Good  In the past 7 days, have you experienced any of the following?  New or Increased Pain, New or Increased Fatigue, Loneliness, Social Isolation, Stress or Anger?: None of These  Do you get the social and emotional support that you need?: Yes  Do you have a Living Will?: Yes  Advance Directives     Power of [employer] Will ACP-Advance Directive ACP-Power of     Not on File Not on File Not on File Not on File      General Health Risk Interventions:  · No Living Will: ACP documents already completed- patient asked to provide copy to the office        Personalized Preventive Plan   Current Health Maintenance Status  Immunization History   Administered Date(s) Administered    Influenza Vaccine, unspecified formulation 10/24/2017    Influenza Virus Vaccine 10/24/2017    Influenza, High Dose (Fluzone 65 yrs and older) 10/10/2018    Influenza, Quadv, adjuvanted, 65 yrs +, IM, PF (Fluad) 10/22/2020    Influenza, Triv, inactivated, subunit, adjuvanted, IM (Fluad 65 yrs and older) 10/21/2019    Pneumococcal Conjugate 13-valent ([id number]) 02/11/2016    Pneumococcal Polysaccharide ([id number]) 01/02/2020    Td (Adult), 2 Lf Tetanus Toxoid, Pf (Td, Absorbed) 05/17/2018    Tdap (Boostrix, Adacel) 10/21/2008        Health Maintenance   Topic Date Due    COVID-19 Vaccine (1) Never done    Shingles Vaccine (1 of 2) Never done   [employer] Visit (AWV)  Never done    Flu vaccine (1) 09/01/2021    Lipid screen  06/14/2022    TSH testing  06/14/2022    Potassium monitoring  06/14/2022    Creatinine monitoring  06/14/2022    DTaP/Tdap/Td vaccine (3 - Td or Tdap) 05/17/2028    DEXA (modify frequency per FRAX score)  Completed    Pneumococcal 65+ years Vaccine  Completed    Hepatitis A vaccine  Aged Out    Hepatitis B vaccine  Aged Out    Hib vaccine  Aged Out    Meningococcal (ACWY) vaccine  Aged Out     Recommendations for [email or web address] Due: see orders and patient instructions/AVS.  . Recommended screening schedule for the next 5-10 years is provided to the patient in written form: see Patient Instructions/AVS.    There are no diagnoses linked to this encounter. Cardiovascular Disease Risk Counseling: Assessed the patient's risk to develop cardiovascular disease and reviewed main risk factors.    Reviewed steps to reduce disease risk including:   · Quitting tobacco use, reducing amount smoked, or not starting the habit  · Making healthy food choices  · Being physically active and gradualy increasing activity levels   · Reduce weight and determine a healthy BMI goal  · Monitor blood pressure and treat if higher than 140/90 mmHg  · Maintain blood total cholesterol levels under 5 mmol/l or 190 mg/dl  · Maintain LDL cholesterol levels under 3.0 mmol/l or 115 mg/dl   · Control blood glucose levels  · Consider taking aspirin (75 mg daily), once blood pressure is controlled   Provided a follow up plan.   Time spent (minutes): 5

## 2021-09-20 NOTE — PROGRESS NOTES
2021    Name: Shalonda Cedillo : 1940 Sex: female  Age: 79 y.o. Subjective:  Chief Complaint   Patient presents with    Hypertension     3 month visit        Hypertension  Pertinent negatives include no chest pain, headaches, palpitations or shortness of breath. Patient had COVID-19 infection earlier this year. Since then she says her energy level has been low. She is not really weak but she notices significant hair loss. She has been trying biotin. She had a chest x-ray after the infection which showed no infiltrates. She lost about 10 pounds during her Covid infection and she has not gained it back. She has not had a bone density scan in a while and she has several risk factors for osteoporosis including thin white female postmenopausal on thyroid medications. We will check a DEXA scan    Mammogram will be scheduled in 2021. She has a history of coronary artery disease, hypertension, hypothyroidism and intermittent dizziness. Medications reviewed. No change from previous. She sees Dr. Jeanna Joshua go on a regular basis. Blood work in 2021 showed her T4 was elevated although her TSH was normal.  We adjusted her levothyroxine and we need to recheck a TSH and free T4 today. She is on levothyroxine 75 mcg 1-1/2 tablets on Wednesday and Friday and 1 tablet the rest of the week. CBC, CMP and lipid profile were acceptable. She is not going to get the COVID-19 vaccine but she may change her mind. She is going to discuss this with her cardiologist.  She is agreeable to getting the flu shot      She needs to follow-up with Dr. Citlaly Cook for her melanoma surveillance. She promises to make the appointment. Review of Systems   Constitutional: Positive for fatigue. Negative for appetite change, fever and unexpected weight change. HENT: Negative for congestion, ear pain, facial swelling, nosebleeds, rhinorrhea, sore throat, tinnitus and trouble swallowing. Hair loss   Eyes: Negative for photophobia, pain, discharge, itching and visual disturbance. Respiratory: Negative for cough, shortness of breath, wheezing and stridor. Cardiovascular: Negative for chest pain, palpitations and leg swelling. Gastrointestinal: Positive for abdominal distention. Negative for abdominal pain, anal bleeding, blood in stool, constipation, diarrhea, nausea and vomiting. Endocrine: Negative for cold intolerance, heat intolerance, polydipsia, polyphagia and polyuria. Genitourinary: Negative for difficulty urinating, dysuria, flank pain, frequency, hematuria and urgency. Musculoskeletal: Positive for back pain. Negative for arthralgias, gait problem, joint swelling and myalgias. Skin: Negative for color change, pallor and rash. Allergic/Immunologic: Negative for environmental allergies and food allergies. Neurological: Negative for dizziness, tremors, seizures, syncope, speech difficulty, weakness, light-headedness, numbness and headaches. Hematological: Negative for adenopathy. Does not bruise/bleed easily. Psychiatric/Behavioral: Negative for agitation, behavioral problems, confusion, sleep disturbance and suicidal ideas. The patient is not nervous/anxious.            Current Outpatient Medications:     Ascorbic Acid (VITAMIN C) 250 MG tablet, Take 250 mg by mouth daily, Disp: , Rfl:     zinc gluconate 50 MG tablet, Take 50 mg by mouth daily, Disp: , Rfl:     metoprolol succinate (TOPROL XL) 50 MG extended release tablet, Take 1 tablet by mouth twice daily, Disp: 180 tablet, Rfl: 3    atorvastatin (LIPITOR) 80 MG tablet, Take 1 tablet by mouth daily, Disp: 90 tablet, Rfl: 3    Cholecalciferol (VITAMIN D3) 50 MCG (2000 UT) CAPS, Take 1 capsule by mouth daily, Disp: 90 capsule, Rfl: 3    levothyroxine (SYNTHROID) 75 MCG tablet, TAKE 1 TABLET BY MOUTH ONCE DAILY -ON  MONDAY,  TAKE 2 TABS, ON WED AND FRIDAYS, TAKE 1.5 TABS, Disp: 150 tablet, Rfl: 3    meclizine appearance. She is well-developed and normal weight. She is not ill-appearing. HENT:      Head: Normocephalic. Right Ear: External ear normal.      Left Ear: External ear normal.      Nose: Nose normal.   Eyes:      General: No scleral icterus. Right eye: No discharge. Left eye: No discharge. Conjunctiva/sclera: Conjunctivae normal.      Pupils: Pupils are equal, round, and reactive to light. Neck:      Thyroid: No thyromegaly. Cardiovascular:      Rate and Rhythm: Normal rate and regular rhythm. Heart sounds: Normal heart sounds. No murmur heard. No friction rub. No gallop. Pulmonary:      Effort: Pulmonary effort is normal. No respiratory distress. Breath sounds: Normal breath sounds. No wheezing or rales. Chest:      Chest wall: No tenderness. Abdominal:      General: Bowel sounds are normal. There is no distension. Palpations: Abdomen is soft. There is no mass. Tenderness: There is no abdominal tenderness. There is no guarding or rebound. Musculoskeletal:         General: No tenderness or deformity. Normal range of motion. Cervical back: Normal range of motion and neck supple. Lymphadenopathy:      Cervical: No cervical adenopathy. Skin:     General: Skin is warm and dry. Coloration: Skin is not pale. Findings: No erythema or rash. Neurological:      General: No focal deficit present. Mental Status: She is alert and oriented to person, place, and time. Cranial Nerves: No cranial nerve deficit. Sensory: No sensory deficit. Deep Tendon Reflexes: Reflexes normal.   Psychiatric:         Mood and Affect: Mood normal.         Behavior: Behavior normal.         Thought Content: Thought content normal.         Judgment: Judgment normal.          Last labs reviewed. ASSESSMENT & PLAN :   Problem List        Circulatory    Essential hypertension - Primary     Blood pressures are stable.  Continue medications and monitor blood pressures at home. Call office if systolics are over 567 over diastolics over 90. Relevant Medications    metoprolol succinate (TOPROL XL) 50 MG extended release tablet    CAD (coronary artery disease)       no angina, follow-up with cardiologist         Relevant Medications    aspirin 81 MG tablet    atorvastatin (LIPITOR) 80 MG tablet    metoprolol succinate (TOPROL XL) 50 MG extended release tablet       Endocrine    Hypothyroidism       check TSH and free T4 stay on the same dose and will get the results         Relevant Medications    levothyroxine (SYNTHROID) 75 MCG tablet    Other Relevant Orders    TSH without Reflex    T4, Free       Other    Hyperlipidemia     Watch saturated fats in diet and will monitor lipids  continue atorvastatin 80 mg daily         Relevant Medications    aspirin 81 MG tablet    atorvastatin (LIPITOR) 80 MG tablet    metoprolol succinate (TOPROL XL) 50 MG extended release tablet           Return in about 3 months (around 12/20/2021), or htn.        Desiree James, DO  9/20/2021

## 2021-10-20 ENCOUNTER — NURSE ONLY (OUTPATIENT)
Dept: PRIMARY CARE CLINIC | Age: 81
End: 2021-10-20
Payer: MEDICARE

## 2021-10-20 DIAGNOSIS — Z23 ENCOUNTER FOR VACCINATION: Primary | ICD-10-CM

## 2021-10-20 PROCEDURE — 90694 VACC AIIV4 NO PRSRV 0.5ML IM: CPT | Performed by: INTERNAL MEDICINE

## 2021-10-20 PROCEDURE — G0008 ADMIN INFLUENZA VIRUS VAC: HCPCS | Performed by: INTERNAL MEDICINE

## 2021-10-20 NOTE — PATIENT INSTRUCTIONS
Patient Education        Influenza (Flu) Vaccine (Inactivated or Recombinant): What You Need to Know  Why get vaccinated? Influenza vaccine can prevent influenza (flu). Flu is a contagious disease that spreads around the [place] every year, usually between October and May. Anyone can get the flu, but it is more dangerous for some people. Infants and young children, people 72years of age and older, pregnant women, and people with certain health conditions or a weakened immune system are at greatest risk of flu complications. Pneumonia, bronchitis, sinus infections and ear infections are examples of flu-related complications. If you have a medical condition, such as heart disease, cancer or diabetes, flu can make it worse. Flu can cause fever and chills, sore throat, muscle aches, fatigue, cough, headache, and runny or stuffy nose. Some people may have vomiting and diarrhea, though this is more common in children than adults. Each year, thousands of people in the [facility] die from flu, and many more are hospitalized. Flu vaccine prevents millions of illnesses and flu-related visits to the doctor each year. Influenza vaccine  CDC recommends everyone 10months of age and older get vaccinated every flu season. Children 6 months through 6years of age may need 2 doses during a single flu season. Everyone else needs only 1 dose each flu season. It takes about 2 weeks for protection to develop after vaccination. There are many flu viruses, and they are always changing. Each year a new flu vaccine is made to protect against three or four viruses that are likely to cause disease in the upcoming flu season. Even when the vaccine doesn't exactly match these viruses, it may still provide some protection. Influenza vaccine does not cause flu. Influenza vaccine may be given at the same time as other vaccines.   Talk with your health care provider  Tell your vaccine provider if the person getting the vaccine:  · Has had an allergic reaction after a previous dose of influenza vaccine, or has any severe, life-threatening allergies. · Has ever had Guillain-Barré Syndrome (also called GBS). In some cases, your health care provider may decide to postpone influenza vaccination to a future visit. People with minor illnesses, such as a cold, may be vaccinated. People who are moderately or severely ill should usually wait until they recover before getting influenza vaccine. Your health care provider can give you more information. Risks of a vaccine reaction  · Soreness, redness, and swelling where shot is given, fever, muscle aches, and headache can happen after influenza vaccine. · There may be a very small increased risk of Guillain-Barré Syndrome (GBS) after inactivated influenza vaccine (the flu shot). Ilsa Chaparro children who get the flu shot along with pneumococcal vaccine (PCV13), and/or DTaP vaccine at the same time might be slightly more likely to have a seizure caused by fever. Tell your health care provider if a child who is getting flu vaccine has ever had a seizure. People sometimes faint after medical procedures, including vaccination. Tell your provider if you feel dizzy or have vision changes or ringing in the ears. As with any medicine, there is a very remote chance of a vaccine causing a severe allergic reaction, other serious injury, or death. What if there is a serious problem? An allergic reaction could occur after the vaccinated person leaves the clinic. If you see signs of a severe allergic reaction (hives, swelling of the face and throat, difficulty breathing, a fast heartbeat, dizziness, or weakness), call 9-1-1 and get the person to the nearest hospital.  For other signs that concern you, call your health care provider. Adverse reactions should be reported to the Vaccine Adverse Event Reporting System (VAERS).  Your health care provider will usually file this report, or you can do it yourself. Visit the VAERS website at www.vaers. hhs.gov or call 9-391.852.7045. VAERS is only for reporting reactions, and VAERS staff do not give medical advice. The National Vaccine Injury Compensation Program  The National Vaccine Injury Compensation Program (VICP) is a federal program that was created to compensate people who may have been injured by certain vaccines. Visit the VICP website at www.hrsa.gov/vaccinecompensation or call 4-568.972.6116 to learn about the program and about filing a claim. There is a time limit to file a claim for compensation. How can I learn more? · Ask your healthcare provider. · Call your local or state health department. · Contact the Centers for Disease Control and Prevention (CDC):  ? Call 4-815.476.4491 (1-800-CDC-INFO) or  ? Visit CDC's website at www.cdc.gov/flu  Vaccine Information Statement (Interim)  Inactivated Influenza Vaccine  8/15/2019  42 BOB [address] [place] 041UC-21  Department of Health and Human Services  Centers for Disease Control and Prevention  Many Vaccine Information Statements are available in Malay and other languages. See www.immunize.org/vis. Muchas hojas de información sobre vacunas están disponibles en español y en otros idiomas. Visite www.immunize.org/vis. Care instructions adapted under license by [facility] ([facility]). If you have questions about a medical condition or this instruction, always ask your healthcare professional. [place] [address] any warranty or liability for your use of this information.

## 2021-11-01 DIAGNOSIS — E03.9 ACQUIRED HYPOTHYROIDISM: ICD-10-CM

## 2021-11-01 RX ORDER — LEVOTHYROXINE SODIUM 0.07 MG/1
TABLET ORAL
Qty: 150 TABLET | Refills: 3 | Status: SHIPPED
Start: 2021-11-01 | End: 2022-09-21 | Stop reason: SDUPTHER

## 2021-12-13 DIAGNOSIS — I10 ESSENTIAL HYPERTENSION: ICD-10-CM

## 2021-12-13 RX ORDER — METOPROLOL SUCCINATE 50 MG/1
TABLET, EXTENDED RELEASE ORAL
Qty: 180 TABLET | Refills: 0 | Status: SHIPPED
Start: 2021-12-13 | End: 2021-12-21 | Stop reason: SDUPTHER

## 2021-12-21 ENCOUNTER — OFFICE VISIT (OUTPATIENT)
Dept: PRIMARY CARE CLINIC | Age: 81
End: 2021-12-21
Payer: MEDICARE

## 2021-12-21 VITALS
SYSTOLIC BLOOD PRESSURE: 130 MMHG | BODY MASS INDEX: 22.5 KG/M2 | OXYGEN SATURATION: 95 % | HEART RATE: 59 BPM | WEIGHT: 127 LBS | DIASTOLIC BLOOD PRESSURE: 70 MMHG | TEMPERATURE: 97.9 F | HEIGHT: 63 IN

## 2021-12-21 DIAGNOSIS — E78.2 MIXED HYPERLIPIDEMIA: ICD-10-CM

## 2021-12-21 DIAGNOSIS — E03.9 ACQUIRED HYPOTHYROIDISM: ICD-10-CM

## 2021-12-21 DIAGNOSIS — I10 ESSENTIAL HYPERTENSION: Primary | ICD-10-CM

## 2021-12-21 DIAGNOSIS — I10 ESSENTIAL HYPERTENSION: ICD-10-CM

## 2021-12-21 DIAGNOSIS — I25.10 CORONARY ARTERY DISEASE INVOLVING NATIVE CORONARY ARTERY OF NATIVE HEART WITHOUT ANGINA PECTORIS: ICD-10-CM

## 2021-12-21 DIAGNOSIS — Z12.31 ENCOUNTER FOR SCREENING MAMMOGRAM FOR BREAST CANCER: ICD-10-CM

## 2021-12-21 LAB
ALBUMIN SERPL-MCNC: 4.3 G/DL (ref 3.5–5.2)
ALP BLD-CCNC: 124 U/L (ref 35–104)
ALT SERPL-CCNC: 27 U/L (ref 0–32)
ANION GAP SERPL CALCULATED.3IONS-SCNC: 12 MMOL/L (ref 7–16)
AST SERPL-CCNC: 23 U/L (ref 0–31)
BILIRUB SERPL-MCNC: 0.4 MG/DL (ref 0–1.2)
BUN BLDV-MCNC: 22 MG/DL (ref 6–23)
CALCIUM SERPL-MCNC: 9.5 MG/DL (ref 8.6–10.2)
CHLORIDE BLD-SCNC: 102 MMOL/L (ref 98–107)
CHOLESTEROL, TOTAL: 123 MG/DL (ref 0–199)
CO2: 25 MMOL/L (ref 22–29)
CREAT SERPL-MCNC: 0.9 MG/DL (ref 0.5–1)
GFR AFRICAN AMERICAN: >60
GFR NON-AFRICAN AMERICAN: 60 ML/MIN/1.73
GLUCOSE BLD-MCNC: 93 MG/DL (ref 74–99)
HDLC SERPL-MCNC: 32 MG/DL
LDL CHOLESTEROL CALCULATED: 53 MG/DL (ref 0–99)
POTASSIUM SERPL-SCNC: 4.9 MMOL/L (ref 3.5–5)
SODIUM BLD-SCNC: 139 MMOL/L (ref 132–146)
T4 FREE: 1.35 NG/DL (ref 0.93–1.7)
TOTAL PROTEIN: 6.8 G/DL (ref 6.4–8.3)
TRIGL SERPL-MCNC: 189 MG/DL (ref 0–149)
TSH SERPL DL<=0.05 MIU/L-ACNC: 4.66 UIU/ML (ref 0.27–4.2)
VLDLC SERPL CALC-MCNC: 38 MG/DL

## 2021-12-21 PROCEDURE — 99214 OFFICE O/P EST MOD 30 MIN: CPT | Performed by: INTERNAL MEDICINE

## 2021-12-21 RX ORDER — METOPROLOL SUCCINATE 50 MG/1
TABLET, EXTENDED RELEASE ORAL
Qty: 180 TABLET | Refills: 3 | Status: SHIPPED
Start: 2021-12-21 | End: 2022-02-21

## 2021-12-21 ASSESSMENT — ENCOUNTER SYMPTOMS
VOMITING: 0
ABDOMINAL PAIN: 0
COUGH: 0
CONSTIPATION: 0
EYE PAIN: 0
BLOOD IN STOOL: 0
COLOR CHANGE: 0
TROUBLE SWALLOWING: 0
SORE THROAT: 0
NAUSEA: 0
ABDOMINAL DISTENTION: 0
DIARRHEA: 0
BACK PAIN: 0
PHOTOPHOBIA: 0
RHINORRHEA: 0
EYE ITCHING: 0
FACIAL SWELLING: 0
SHORTNESS OF BREATH: 0
WHEEZING: 0
EYE DISCHARGE: 0
STRIDOR: 0
ANAL BLEEDING: 0

## 2021-12-21 NOTE — ASSESSMENT & PLAN NOTE
Blood pressures are stable. Continue medications and monitor blood pressures at home.  Call office if systolics are over 737 over diastolics over 90.  testing CMP

## 2021-12-21 NOTE — PROGRESS NOTES
2021    Name: Clary Johnson : 1940 Sex: female  Age: 79 y.o. Subjective:  Chief Complaint   Patient presents with    Hypertension     3 month visit        Hypertension  Pertinent negatives include no chest pain, headaches, palpitations or shortness of breath. Patient had COVID-19 infection earlier this year. Since then she says her energy level has been low. She is not really weak but she notices significant hair loss. She has been trying biotin. She had a chest x-ray after the infection which showed no infiltrates. She lost about 10 pounds during her Covid infection and she has not gained it back. She has not had a bone density scan in a while and she has several risk factors for osteoporosis including thin white female postmenopausal on thyroid medications. We will check a DEXA scan    Mammogram will be scheduled in 2021. Pete Doherty Requisition given. She has a history of coronary artery disease, hypertension, hypothyroidism and intermittent dizziness. Medications reviewed. No change from previous. She sees Dr. Mervin gill on a regular basis. Her TSH was slightly elevated we kept her levothyroxine and 75 mcg a day with an extra half a pill on Wednesday and Friday only. We will recheck her TSH today      CBC, CMP and lipid profile were acceptable. She is not going to get the COVID-19 vaccine but she may change her mind. She is going to discuss this with her cardiologist.  She is agreeable to getting the flu shot    She had her eye examination done by Dr. Arnie Cunningham last month    She needs to follow-up with Dr. Marybel Garcia for her melanoma surveillance. She promises to make the appointment. Review of Systems   Constitutional: Negative for appetite change, fatigue, fever and unexpected weight change. HENT: Negative for congestion, ear pain, facial swelling, nosebleeds, rhinorrhea, sore throat, tinnitus and trouble swallowing.          Hair loss   Eyes: Negative for photophobia, pain, discharge, itching and visual disturbance. Respiratory: Negative for cough, shortness of breath, wheezing and stridor. Cardiovascular: Negative for chest pain, palpitations and leg swelling. Gastrointestinal: Negative for abdominal distention, abdominal pain, anal bleeding, blood in stool, constipation, diarrhea, nausea and vomiting. Endocrine: Negative for cold intolerance, heat intolerance, polydipsia, polyphagia and polyuria. Genitourinary: Negative for difficulty urinating, dysuria, flank pain, frequency, hematuria and urgency. Musculoskeletal: Negative for arthralgias, back pain, gait problem, joint swelling and myalgias. Skin: Negative for color change, pallor and rash. Allergic/Immunologic: Negative for environmental allergies and food allergies. Neurological: Negative for dizziness, tremors, seizures, syncope, speech difficulty, weakness, light-headedness, numbness and headaches. Hematological: Negative for adenopathy. Does not bruise/bleed easily. Psychiatric/Behavioral: Negative for agitation, behavioral problems, confusion, sleep disturbance and suicidal ideas. The patient is not nervous/anxious.            Current Outpatient Medications:     metoprolol succinate (TOPROL XL) 50 MG extended release tablet, Take 1 tablet by mouth twice daily, Disp: 180 tablet, Rfl: 3    levothyroxine (SYNTHROID) 75 MCG tablet, TAKE 1 TABLET BY MOUTH ONCE DAILY -ON  MONDAY,  TAKE 2 TABS, ON WED AND FRIDAYS, TAKE 1.5 TABS, Disp: 150 tablet, Rfl: 3    Ascorbic Acid (VITAMIN C) 250 MG tablet, Take 250 mg by mouth daily, Disp: , Rfl:     zinc gluconate 50 MG tablet, Take 50 mg by mouth daily, Disp: , Rfl:     atorvastatin (LIPITOR) 80 MG tablet, Take 1 tablet by mouth daily, Disp: 90 tablet, Rfl: 3    Cholecalciferol (VITAMIN D3) 50 MCG (2000 UT) CAPS, Take 1 capsule by mouth daily, Disp: 90 capsule, Rfl: 3    meclizine (ANTIVERT) 25 MG tablet, Take 1 tablet by mouth daily as needed for Dizziness, Disp: 90 tablet, Rfl: 3    BIOTIN MAXIMUM PO, Take by mouth, Disp: , Rfl:     aspirin 81 MG tablet, Take 1 tablet by mouth daily, Disp: 90 tablet, Rfl: 5     Allergies   Allergen Reactions    Adhesive Tape Rash        Past Medical History:   Diagnosis Date    CAD (coronary artery disease)     Colon polyps     Flu vaccine need 10/21/2019    GERD (gastroesophageal reflux disease)     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 Patient Active Problem List   Diagnosis    Essential hypertension    Dizziness    Hypothyroidism    Hyperlipidemia    CAD (coronary artery disease)    Melanoma ([address]    Neck pain on left side    Vitamin D deficiency    Abdominal distention    Encounter for screening mammogram for breast cancer    COVID-19 virus infection    Chronic fatigue        Past Surgical History:   Procedure Laterality Date     SECTION     Crissy Chante CORONARY ARTERY BYPASS GRAFT  2014    LITHOTRIPSY      CHRISTIAN AND BSO          Family History   Problem Relation Age of Onset    Coronary Art Dis Mother     Other Mother         CVA    Heart Attack Father     Pancreatic Cancer Sister     Lung Cancer Sister         Social History     Tobacco Use    Smoking status: Never Smoker    Smokeless tobacco: Never Used   Substance Use Topics    Alcohol use: Never    Drug use: Never        Objective  Vitals:    21 1029   BP: 130/70   Pulse: 59   Temp: 97.9 °F (36.6 °C)   SpO2: 95%   Weight: 127 lb (57.6 kg)   Height: 5' 3\" (1.6 m)        Exam:  Physical Exam  Vitals reviewed. Constitutional:       General: She is not in acute distress. Appearance: Normal appearance. She is well-developed and normal weight. She is not ill-appearing. HENT:      Head: Normocephalic.       Right Ear: External ear normal.      Left Ear: External ear normal.      Nose: Nose normal.   Eyes:      General: No scleral icterus. Right eye: No discharge. Left eye: No discharge. Conjunctiva/sclera: Conjunctivae normal.      Pupils: Pupils are equal, round, and reactive to light. Neck:      Thyroid: No thyromegaly. Comments: Questionable bruit heard at the base of the left side of her neck  Cardiovascular:      Rate and Rhythm: Normal rate and regular rhythm. Heart sounds: Normal heart sounds. No murmur heard. No friction rub. No gallop. Pulmonary:      Effort: Pulmonary effort is normal. No respiratory distress. Breath sounds: Normal breath sounds. No wheezing or rales. Chest:      Chest wall: No tenderness. Abdominal:      General: Bowel sounds are normal. There is no distension. Palpations: Abdomen is soft. There is no mass. Tenderness: There is no abdominal tenderness. There is no guarding or rebound. Musculoskeletal:         General: No tenderness or deformity. Normal range of motion. Cervical back: Normal range of motion and neck supple. Lymphadenopathy:      Cervical: No cervical adenopathy. Skin:     General: Skin is warm and dry. Coloration: Skin is not pale. Findings: No erythema or rash. Neurological:      General: No focal deficit present. Mental Status: She is alert and oriented to person, place, and time. Cranial Nerves: No cranial nerve deficit. Sensory: No sensory deficit. Deep Tendon Reflexes: Reflexes normal.   Psychiatric:         Mood and Affect: Mood normal.         Behavior: Behavior normal.         Thought Content: Thought content normal.         Judgment: Judgment normal.          Last labs reviewed. ASSESSMENT & PLAN :   Problem List        Circulatory    Essential hypertension - Primary     Blood pressures are stable. Continue medications and monitor blood pressures at home.  Call office if systolics are over 465 over diastolics over 90.  testing CMP         Relevant Medications    metoprolol succinate (TOPROL XL) 50 MG extended release tablet    Other Relevant Orders    Comprehensive Metabolic Panel    CAD (coronary artery disease)       has no angina, continue aspirin and other medications and follow-up with cardiology         Relevant Medications    aspirin 81 MG tablet    atorvastatin (LIPITOR) 80 MG tablet    metoprolol succinate (TOPROL XL) 50 MG extended release tablet       Endocrine    Hypothyroidism       check TSH and free T4, continue levothyroxine as before until we get results back         Relevant Medications    levothyroxine (SYNTHROID) 75 MCG tablet    Other Relevant Orders    TSH without Reflex    T4, Free       Other    Hyperlipidemia     Watch saturated fats in diet and will monitor lipids  continue atorvastatin 80 mg a day         Relevant Medications    aspirin 81 MG tablet    atorvastatin (LIPITOR) 80 MG tablet    metoprolol succinate (TOPROL XL) 50 MG extended release tablet    Other Relevant Orders    Lipid Panel    Encounter for screening mammogram for breast cancer       requisition for screening mammogram to be [facility] at [facility]         Relevant Orders    [facility]  [address] CAD BILATERAL           Return in about 3 months (around 3/21/2022), or get mamm.        Christina Hassan,   12/21/2021

## 2022-01-20 PROBLEM — Z12.31 ENCOUNTER FOR SCREENING MAMMOGRAM FOR BREAST CANCER: Status: RESOLVED | Noted: 2020-10-22 | Resolved: 2022-01-20

## 2022-02-20 DIAGNOSIS — I10 ESSENTIAL HYPERTENSION: ICD-10-CM

## 2022-02-21 RX ORDER — METOPROLOL SUCCINATE 50 MG/1
TABLET, EXTENDED RELEASE ORAL
Qty: 180 TABLET | Refills: 0 | Status: SHIPPED
Start: 2022-02-21 | End: 2022-09-21 | Stop reason: SDUPTHER

## 2022-02-21 NOTE — TELEPHONE ENCOUNTER
Last Appointment:  12/21/2021  Future Appointments   Date Time Provider Lee Ann Cash   3/22/2022 10:30 AM [address], DO [facility]   9/21/2022 10:00 AM DO Arvin Parker [facility]

## 2022-03-22 ENCOUNTER — OFFICE VISIT (OUTPATIENT)
Dept: PRIMARY CARE CLINIC | Age: 82
End: 2022-03-22
Payer: MEDICARE

## 2022-03-22 VITALS
HEART RATE: 54 BPM | WEIGHT: 123 LBS | DIASTOLIC BLOOD PRESSURE: 72 MMHG | SYSTOLIC BLOOD PRESSURE: 136 MMHG | TEMPERATURE: 97.9 F | OXYGEN SATURATION: 94 % | BODY MASS INDEX: 21.79 KG/M2 | HEIGHT: 63 IN

## 2022-03-22 DIAGNOSIS — G89.29 CHRONIC RIGHT-SIDED THORACIC BACK PAIN: ICD-10-CM

## 2022-03-22 DIAGNOSIS — I10 ESSENTIAL HYPERTENSION: Primary | ICD-10-CM

## 2022-03-22 DIAGNOSIS — E03.9 ACQUIRED HYPOTHYROIDISM: ICD-10-CM

## 2022-03-22 DIAGNOSIS — E78.2 MIXED HYPERLIPIDEMIA: ICD-10-CM

## 2022-03-22 DIAGNOSIS — I10 ESSENTIAL HYPERTENSION: ICD-10-CM

## 2022-03-22 DIAGNOSIS — M54.6 CHRONIC RIGHT-SIDED THORACIC BACK PAIN: ICD-10-CM

## 2022-03-22 DIAGNOSIS — I25.10 CORONARY ARTERY DISEASE INVOLVING NATIVE CORONARY ARTERY OF NATIVE HEART WITHOUT ANGINA PECTORIS: ICD-10-CM

## 2022-03-22 LAB
ALBUMIN SERPL-MCNC: 4.5 G/DL (ref 3.5–5.2)
ALP BLD-CCNC: 117 U/L (ref 35–104)
ALT SERPL-CCNC: 19 U/L (ref 0–32)
ANION GAP SERPL CALCULATED.3IONS-SCNC: 15 MMOL/L (ref 7–16)
AST SERPL-CCNC: 26 U/L (ref 0–31)
BILIRUB SERPL-MCNC: 0.6 MG/DL (ref 0–1.2)
BUN BLDV-MCNC: 17 MG/DL (ref 6–23)
CALCIUM SERPL-MCNC: 9.7 MG/DL (ref 8.6–10.2)
CHLORIDE BLD-SCNC: 104 MMOL/L (ref 98–107)
CHOLESTEROL, TOTAL: 128 MG/DL (ref 0–199)
CO2: 24 MMOL/L (ref 22–29)
CREAT SERPL-MCNC: 0.9 MG/DL (ref 0.5–1)
GFR AFRICAN AMERICAN: >60
GFR NON-AFRICAN AMERICAN: 60 ML/MIN/1.73
GLUCOSE BLD-MCNC: 90 MG/DL (ref 74–99)
HDLC SERPL-MCNC: 34 MG/DL
LDL CHOLESTEROL CALCULATED: 63 MG/DL (ref 0–99)
POTASSIUM SERPL-SCNC: 5.2 MMOL/L (ref 3.5–5)
SODIUM BLD-SCNC: 143 MMOL/L (ref 132–146)
T4 FREE: 1.71 NG/DL (ref 0.93–1.7)
TOTAL PROTEIN: 7.7 G/DL (ref 6.4–8.3)
TRIGL SERPL-MCNC: 157 MG/DL (ref 0–149)
TSH SERPL DL<=0.05 MIU/L-ACNC: 3.37 UIU/ML (ref 0.27–4.2)
VLDLC SERPL CALC-MCNC: 31 MG/DL

## 2022-03-22 PROCEDURE — 99214 OFFICE O/P EST MOD 30 MIN: CPT | Performed by: INTERNAL MEDICINE

## 2022-03-22 ASSESSMENT — ENCOUNTER SYMPTOMS
COUGH: 0
SHORTNESS OF BREATH: 0
BLOOD IN STOOL: 0
EYE PAIN: 0
BACK PAIN: 0
EYE ITCHING: 0
NAUSEA: 0
RHINORRHEA: 0
COLOR CHANGE: 0
TROUBLE SWALLOWING: 0
WHEEZING: 0
EYE DISCHARGE: 0
PHOTOPHOBIA: 0
ANAL BLEEDING: 0
SORE THROAT: 0
FACIAL SWELLING: 0
ABDOMINAL DISTENTION: 0
CONSTIPATION: 0
VOMITING: 0
ABDOMINAL PAIN: 0
STRIDOR: 0
DIARRHEA: 0

## 2022-03-22 NOTE — PROGRESS NOTES
3/23/2022    Name: Bill Betancourt : 1940 Sex: female  Age: 79 y.o. Subjective:  Chief Complaint   Patient presents with    Hypertension        Hypertension  Pertinent negatives include no chest pain, headaches, palpitations or shortness of breath. She has a history of coronary artery disease, hypertension, hypothyroidism and intermittent dizziness. Medications reviewed. No change from previous. She sees Dr. Cris Case go on a regular basis. She complains of right-sided infrascapular back pain for the last month or so. Worse when she takes a deep breath. Is not a sharp pleuritic pain but more of a dull ache. It can come and go and is usually worsened by movement. We will check x-ray    Bone density scan done in 2021 showed osteopenia with the lowest T score of -1.8 in the hip. This was a significant decrease from previous examination on her FRAX score was elevated at 4.7% for 10-year risk of hip fracture. She is on calcium and vitamin D3. Mammogram in 2022 showed breast artery calcifications and a postbiopsy clip in the left breast with no growth. No change from previous and no mammographic evidence of malignancy. Her TSH was slightly elevated at 4.660. We kept her levothyroxine and 75 mcg a day with an extra half a pill on Wednesday and Friday only. We will recheck her TSH today      CBC,  were acceptable. Lipids showed slightly increased triglycerides at 189. Total cholesterol 123 and LDL cholesterol 53. [facility] CMP showed an elevated alkaline phosphatase of 124. Will monitor    She is not going to get the COVID-19 vaccine but she may change her mind. She is going to discuss this with her cardiologist.  She got her flu shot    She had her eye examination done by Dr. Kaylynn Rodriguez last month    She needs to follow-up with Dr. Rick Ramirez for her melanoma surveillance. She promises to make the appointment.     Review of Systems   Constitutional: Negative for appetite change, daily, Disp: 90 tablet, Rfl: 3    Cholecalciferol (VITAMIN D3) 50 MCG ( UT) CAPS, Take 1 capsule by mouth daily, Disp: 90 capsule, Rfl: 3    meclizine (ANTIVERT) 25 MG tablet, Take 1 tablet by mouth daily as needed for Dizziness, Disp: 90 tablet, Rfl: 3    BIOTIN MAXIMUM PO, Take by mouth, Disp: , Rfl:     aspirin 81 MG tablet, Take 1 tablet by mouth daily, Disp: 90 tablet, Rfl: 5     Allergies   Allergen Reactions    Adhesive Tape Rash        Past Medical History:   Diagnosis Date    CAD (coronary artery disease)     Colon polyps     Flu vaccine need 10/21/2019    GERD (gastroesophageal reflux disease)     Hot flashes due to surgical menopause 2020    Hyperlipidemia     Hypothyroidism     Leg cramps, sleep related 2020    [address] ([facility] [address]     Osteopenia        There are no preventive care reminders to display for this patient. Patient Active Problem List   Diagnosis    Essential hypertension    Dizziness    Hypothyroidism    Hyperlipidemia    CAD (coronary artery disease)    [facility]anoma ([facility] [address]    Neck pain on left side    Vitamin D deficiency    Abdominal distention    COVID-19 virus infection    Chronic fatigue    Chronic right-sided thoracic back pain        Past Surgical History:   Procedure Laterality Date     SECTION     Lennie Cabezas CORONARY ARTERY BYPASS GRAFT  2014    LITHOTRIPSY      CHRISTIAN AND BSO          Family History   Problem Relation Age of Onset    Coronary Art Dis Mother     Other Mother         CVA    Heart Attack Father     Pancreatic Cancer Sister     Lung Cancer Sister         Social History     Tobacco Use    Smoking status: Never Smoker    Smokeless tobacco: Never Used   Substance Use Topics    Alcohol use: Never    Drug use: Never        Objective  Vitals:    22 1004   BP: 136/72   Pulse: 54   Temp: 97.9 °F (36.6 °C)   SpO2: 94%   Weight: 123 lb (55.8 kg)   Height: 5' 3\" (1.6 m)        Exam:  Physical Exam  Vitals reviewed.    Constitutional: General: She is not in acute distress. Appearance: Normal appearance. She is well-developed and normal weight. She is not ill-appearing. HENT:      Head: Normocephalic. Right Ear: External ear normal.      Left Ear: External ear normal.      Nose: Nose normal.   Eyes:      General: No scleral icterus. Right eye: No discharge. Left eye: No discharge. Conjunctiva/sclera: Conjunctivae normal.      Pupils: Pupils are equal, round, and reactive to light. Neck:      Thyroid: No thyromegaly. Comments: Questionable bruit heard at the base of the left side of her neck  Cardiovascular:      Rate and Rhythm: Normal rate and regular rhythm. Heart sounds: Normal heart sounds. No murmur heard. No friction rub. No gallop. Pulmonary:      Effort: Pulmonary effort is normal. No respiratory distress. Breath sounds: Normal breath sounds. No wheezing or rales. Chest:      Chest wall: No tenderness. Abdominal:      General: Bowel sounds are normal. There is no distension. Palpations: Abdomen is soft. There is no mass. Tenderness: There is no abdominal tenderness. There is no guarding or rebound. Musculoskeletal:         General: No tenderness or deformity. Normal range of motion. Cervical back: Normal range of motion and neck supple. Lymphadenopathy:      Cervical: No cervical adenopathy. Skin:     General: Skin is warm and dry. Coloration: Skin is not pale. Findings: No erythema or rash. Neurological:      General: No focal deficit present. Mental Status: She is alert and oriented to person, place, and time. Cranial Nerves: No cranial nerve deficit. Sensory: No sensory deficit. Deep Tendon Reflexes: Reflexes normal.   Psychiatric:         Mood and Affect: Mood normal.         Behavior: Behavior normal.         Thought Content: Thought content normal.         Judgment: Judgment normal.          Last labs reviewed. ASSESSMENT & PLAN :   Problem List        Circulatory    Essential hypertension - Primary     Blood pressures are stable. Continue medications and monitor blood pressures at home. Call office if systolics are over 905 over diastolics over 90.  check fasting CMP, monitor alkaline phosphatase         Relevant Medications    metoprolol succinate (TOPROL XL) 50 MG extended release tablet    Other Relevant Orders    Comprehensive Metabolic Panel (Completed)    CAD (coronary artery disease)       no angina, continue beta-blocker aspirin and statin. Follow-up with cardiologist         Relevant Medications    aspirin 81 MG tablet    atorvastatin (LIPITOR) 80 MG tablet    metoprolol succinate (TOPROL XL) 50 MG extended release tablet       Endocrine    Hypothyroidism       last TSH slightly elevated. We will recheck and if it is going up we will have to adjust of levothyroxine         Relevant Medications    levothyroxine (SYNTHROID) 75 MCG tablet    Other Relevant Orders    T4, Free (Completed)    TSH (Completed)       Other    Hyperlipidemia     Watch saturated fats in diet and will monitor lipids  continue atorvastatin 80 mg daily         Relevant Medications    aspirin 81 MG tablet    atorvastatin (LIPITOR) 80 MG tablet    metoprolol succinate (TOPROL XL) 50 MG extended release tablet    Other Relevant Orders    Lipid Panel (Completed)    Chronic right-sided thoracic back pain    Relevant Medications    aspirin 81 MG tablet    Other Relevant Orders    XR CHEST (2 VW)           Return in about 3 months (around 6/22/2022), or HTN,Hypothyroidism.        Fernando Stroud DO  3/23/2022

## 2022-03-23 NOTE — ASSESSMENT & PLAN NOTE
Blood pressures are stable. Continue medications and monitor blood pressures at home.  Call office if systolics are over 166 over diastolics over 90.  check fasting CMP, monitor alkaline phosphatase

## 2022-03-23 NOTE — ASSESSMENT & PLAN NOTE
last TSH slightly elevated.   We will recheck and if it is going up we will have to adjust of levothyroxine

## 2022-06-01 DIAGNOSIS — E78.2 MIXED HYPERLIPIDEMIA: ICD-10-CM

## 2022-06-01 RX ORDER — ATORVASTATIN CALCIUM 80 MG/1
TABLET, FILM COATED ORAL
Qty: 76 TABLET | Refills: 0 | Status: SHIPPED
Start: 2022-06-01 | End: 2022-08-18

## 2022-06-22 ENCOUNTER — OFFICE VISIT (OUTPATIENT)
Dept: PRIMARY CARE CLINIC | Age: 82
End: 2022-06-22
Payer: MEDICARE

## 2022-06-22 VITALS
HEIGHT: 63 IN | WEIGHT: 125 LBS | DIASTOLIC BLOOD PRESSURE: 74 MMHG | HEART RATE: 58 BPM | TEMPERATURE: 97.9 F | OXYGEN SATURATION: 96 % | BODY MASS INDEX: 22.15 KG/M2 | SYSTOLIC BLOOD PRESSURE: 128 MMHG

## 2022-06-22 DIAGNOSIS — I10 ESSENTIAL HYPERTENSION: ICD-10-CM

## 2022-06-22 DIAGNOSIS — E87.5 HYPERKALEMIA: ICD-10-CM

## 2022-06-22 DIAGNOSIS — I25.10 CORONARY ARTERY DISEASE INVOLVING NATIVE CORONARY ARTERY OF NATIVE HEART WITHOUT ANGINA PECTORIS: Primary | ICD-10-CM

## 2022-06-22 DIAGNOSIS — E78.2 MIXED HYPERLIPIDEMIA: ICD-10-CM

## 2022-06-22 LAB — POTASSIUM SERPL-SCNC: 4.9 MMOL/L (ref 3.5–5)

## 2022-06-22 PROCEDURE — 1123F ACP DISCUSS/DSCN MKR DOCD: CPT | Performed by: INTERNAL MEDICINE

## 2022-06-22 PROCEDURE — 99213 OFFICE O/P EST LOW 20 MIN: CPT | Performed by: INTERNAL MEDICINE

## 2022-06-22 SDOH — ECONOMIC STABILITY: FOOD INSECURITY: WITHIN THE PAST 12 MONTHS, THE FOOD YOU BOUGHT JUST DIDN'T LAST AND YOU DIDN'T HAVE MONEY TO GET MORE.: NEVER TRUE

## 2022-06-22 SDOH — ECONOMIC STABILITY: FOOD INSECURITY: WITHIN THE PAST 12 MONTHS, YOU WORRIED THAT YOUR FOOD WOULD RUN OUT BEFORE YOU GOT MONEY TO BUY MORE.: NEVER TRUE

## 2022-06-22 ASSESSMENT — ENCOUNTER SYMPTOMS
COLOR CHANGE: 0
STRIDOR: 0
SORE THROAT: 0
NAUSEA: 0
EYE ITCHING: 0
BLOOD IN STOOL: 0
DIARRHEA: 0
TROUBLE SWALLOWING: 0
FACIAL SWELLING: 0
SHORTNESS OF BREATH: 0
PHOTOPHOBIA: 0
BACK PAIN: 0
EYE PAIN: 0
CONSTIPATION: 0
ANAL BLEEDING: 0
RHINORRHEA: 0
EYE DISCHARGE: 0
ABDOMINAL PAIN: 0
ABDOMINAL DISTENTION: 0
VOMITING: 0
WHEEZING: 0
COUGH: 0

## 2022-06-22 ASSESSMENT — SOCIAL DETERMINANTS OF HEALTH (SDOH): HOW HARD IS IT FOR YOU TO PAY FOR THE VERY BASICS LIKE FOOD, HOUSING, MEDICAL CARE, AND HEATING?: NOT HARD AT ALL

## 2022-06-22 ASSESSMENT — PATIENT HEALTH QUESTIONNAIRE - PHQ9
SUM OF ALL RESPONSES TO PHQ9 QUESTIONS 1 & 2: 0
SUM OF ALL RESPONSES TO PHQ QUESTIONS 1-9: 0
2. FEELING DOWN, DEPRESSED OR HOPELESS: 0
SUM OF ALL RESPONSES TO PHQ QUESTIONS 1-9: 0
SUM OF ALL RESPONSES TO PHQ QUESTIONS 1-9: 0
1. LITTLE INTEREST OR PLEASURE IN DOING THINGS: 0
SUM OF ALL RESPONSES TO PHQ QUESTIONS 1-9: 0

## 2022-06-22 NOTE — PROGRESS NOTES
2022    Name: Riccardo Leon : 1940 Sex: female  Age: 79 y.o. Subjective:  Chief Complaint   Patient presents with    Hypertension        Hypertension  Pertinent negatives include no chest pain, headaches, palpitations or shortness of breath. She has a history of coronary artery disease, hypertension, hypothyroidism and intermittent dizziness. Medications reviewed. No change from previous. She sees Dr. Estrella Pearson go on a regular basis. Chest x-ray was done which showed hyperinflation suggestive of COPD. Patient is a never smoker however she was exposed to a lot of fumes as well as secondhand smoke. She has no symptoms suggestive of COPD such as shortness of breath, wheezing, cough    Bone density scan done in 2021 showed osteopenia with the lowest T score of -1.8 in the hip. This was a significant decrease from previous examination on her FRAX score was elevated at 4.7% for 10-year risk of hip fracture. She is on calcium and vitamin D3. Mammogram in 2022 showed breast artery calcifications and a postbiopsy clip in the left breast with no growth. No change from previous and no mammographic evidence of malignancy. Her TSH was slightly elevated at 4.660. We kept her levothyroxine and 75 mcg a day with an extra half a pill on Wednesday and Friday only. TSH was rechecked and it was normal.  We will keep her on the same dose. In 2022 her CBC,  were acceptable. Lipids showed slightly increased triglycerides at 157. Total cholesterol 128 and LDL cholesterol 63. [place] [facility] CMP showed an elevated alkaline phosphatase of 124. Will monitor. Her potassium was elevated at 5.2. This may have been from  red blood cells sitting around and lysis of the cells however I will recheck it    She is not going to get the COVID-19 vaccine but she may change her mind.   She is going to discuss this with her cardiologist.  She got her flu shot    She had her eye examination done by  B[facility] last month    She saw Dr. Aracelis Carney for her melanoma surveillance. She has some sun exposure on her hands and the nape of her neck which caused some mild blistering suggestive of sun sensitivity. I recommended that she use an SPF 30 sunscreen on her hands and neck. Review of Systems   Constitutional: Negative for appetite change, fatigue, fever and unexpected weight change. HENT: Negative for congestion, ear pain, facial swelling, nosebleeds, rhinorrhea, sore throat, tinnitus and trouble swallowing. Hair loss   Eyes: Negative for photophobia, pain, discharge, itching and visual disturbance. Respiratory: Negative for cough, shortness of breath, wheezing and stridor. Cardiovascular: Negative for chest pain, palpitations and leg swelling. Gastrointestinal: Negative for abdominal distention, abdominal pain, anal bleeding, blood in stool, constipation, diarrhea, nausea and vomiting. Endocrine: Negative for cold intolerance, heat intolerance, polydipsia, polyphagia and polyuria. Genitourinary: Negative for difficulty urinating, dysuria, flank pain, frequency, hematuria and urgency. Musculoskeletal: Negative for arthralgias, back pain, gait problem, joint swelling and myalgias. Skin: Negative for color change, pallor and rash. Allergic/Immunologic: Negative for environmental allergies and food allergies. Neurological: Negative for dizziness, tremors, seizures, syncope, speech difficulty, weakness, light-headedness, numbness and headaches. Hematological: Negative for adenopathy. Does not bruise/bleed easily. Psychiatric/Behavioral: Negative for agitation, behavioral problems, confusion, sleep disturbance and suicidal ideas. The patient is not nervous/anxious.            Current Outpatient Medications:     atorvastatin (LIPITOR) 80 MG tablet, Take 1 tablet by mouth once daily, Disp: 76 tablet, Rfl: 0    metoprolol succinate (TOPROL XL) 50 MG extended release tablet, Take 1 tablet by alert and oriented to person, place, and time. Cranial Nerves: No cranial nerve deficit. Sensory: No sensory deficit. Deep Tendon Reflexes: Reflexes normal.   Psychiatric:         Mood and Affect: Mood normal.         Behavior: Behavior normal.         Thought Content: Thought content normal.         Judgment: Judgment normal.          Last labs reviewed. ASSESSMENT & PLAN :   Problem List        Circulatory    Essential hypertension     Blood pressures are stable. Continue medications and monitor blood pressures at home. Call office if systolics are over 527 over diastolics over 90. Relevant Medications    metoprolol succinate (TOPROL XL) 50 MG extended release tablet    CAD (coronary artery disease) - Primary       no angina, continue her beta-blocker and aspirin and follow-up with her cardiologist         Relevant Medications    aspirin 81 MG tablet    metoprolol succinate (TOPROL XL) 50 MG extended release tablet    atorvastatin (LIPITOR) 80 MG tablet       Other    Hyperkalemia       check serum potassium         Relevant Orders    Potassium    Hyperlipidemia     Watch saturated fats in diet and will monitor lipids  on her next office visit         Relevant Medications    aspirin 81 MG tablet    metoprolol succinate (TOPROL XL) 50 MG extended release tablet    atorvastatin (LIPITOR) 80 MG tablet           Return in about 3 months (around 9/22/2022), or htn, Hl be fasting and AWV.        Bridget Jackman DO  6/22/2022

## 2022-06-22 NOTE — ASSESSMENT & PLAN NOTE
Blood pressures are stable. Continue medications and monitor blood pressures at home. Call office if systolics are over 984 over diastolics over 90.

## 2022-08-18 DIAGNOSIS — E78.2 MIXED HYPERLIPIDEMIA: ICD-10-CM

## 2022-08-18 RX ORDER — ATORVASTATIN CALCIUM 80 MG/1
TABLET, FILM COATED ORAL
Qty: 90 TABLET | Refills: 0 | Status: SHIPPED
Start: 2022-08-18 | End: 2022-09-21 | Stop reason: SDUPTHER

## 2022-08-18 NOTE — TELEPHONE ENCOUNTER
Last Appointment:  6/22/2022  Future Appointments   Date Time Provider Lee Ann Cash   9/21/2022 10:00  S Bi Serrano   9/21/2022 12:30 PM [address] [facility]

## 2022-09-21 ENCOUNTER — OFFICE VISIT (OUTPATIENT)
Dept: PRIMARY CARE CLINIC | Age: 82
End: 2022-09-21
Payer: MEDICARE

## 2022-09-21 ENCOUNTER — TELEPHONE (OUTPATIENT)
Dept: PRIMARY CARE CLINIC | Age: 82
End: 2022-09-21

## 2022-09-21 VITALS
HEIGHT: 63 IN | OXYGEN SATURATION: 97 % | TEMPERATURE: 97.3 F | SYSTOLIC BLOOD PRESSURE: 108 MMHG | HEART RATE: 57 BPM | BODY MASS INDEX: 22.32 KG/M2 | WEIGHT: 126 LBS | DIASTOLIC BLOOD PRESSURE: 66 MMHG

## 2022-09-21 VITALS
WEIGHT: 126 LBS | TEMPERATURE: 97.3 F | HEART RATE: 57 BPM | OXYGEN SATURATION: 97 % | DIASTOLIC BLOOD PRESSURE: 66 MMHG | BODY MASS INDEX: 22.32 KG/M2 | SYSTOLIC BLOOD PRESSURE: 108 MMHG | HEIGHT: 63 IN

## 2022-09-21 DIAGNOSIS — Z00.00 MEDICARE ANNUAL WELLNESS VISIT, SUBSEQUENT: Primary | ICD-10-CM

## 2022-09-21 DIAGNOSIS — Z23 NEED FOR INFLUENZA VACCINATION: ICD-10-CM

## 2022-09-21 DIAGNOSIS — Z00.00 ROUTINE GENERAL MEDICAL EXAMINATION AT A HEALTH CARE FACILITY: ICD-10-CM

## 2022-09-21 DIAGNOSIS — K21.9 GASTROESOPHAGEAL REFLUX DISEASE, UNSPECIFIED WHETHER ESOPHAGITIS PRESENT: ICD-10-CM

## 2022-09-21 DIAGNOSIS — E78.2 MIXED HYPERLIPIDEMIA: ICD-10-CM

## 2022-09-21 DIAGNOSIS — E03.9 ACQUIRED HYPOTHYROIDISM: ICD-10-CM

## 2022-09-21 DIAGNOSIS — R42 DIZZINESS: ICD-10-CM

## 2022-09-21 DIAGNOSIS — E55.9 VITAMIN D DEFICIENCY: ICD-10-CM

## 2022-09-21 DIAGNOSIS — I10 ESSENTIAL HYPERTENSION: Primary | ICD-10-CM

## 2022-09-21 DIAGNOSIS — I10 ESSENTIAL HYPERTENSION: ICD-10-CM

## 2022-09-21 PROBLEM — E87.5 HYPERKALEMIA: Status: RESOLVED | Noted: 2022-06-22 | Resolved: 2022-09-21

## 2022-09-21 LAB
ALBUMIN SERPL-MCNC: 4.4 G/DL (ref 3.5–5.2)
ALP BLD-CCNC: 124 U/L (ref 35–104)
ALT SERPL-CCNC: 22 U/L (ref 0–32)
ANION GAP SERPL CALCULATED.3IONS-SCNC: 11 MMOL/L (ref 7–16)
AST SERPL-CCNC: 22 U/L (ref 0–31)
BILIRUB SERPL-MCNC: 0.4 MG/DL (ref 0–1.2)
BUN BLDV-MCNC: 16 MG/DL (ref 6–23)
CALCIUM SERPL-MCNC: 9.6 MG/DL (ref 8.6–10.2)
CHLORIDE BLD-SCNC: 103 MMOL/L (ref 98–107)
CHOLESTEROL, TOTAL: 127 MG/DL (ref 0–199)
CO2: 26 MMOL/L (ref 22–29)
CREAT SERPL-MCNC: 1 MG/DL (ref 0.5–1)
GFR AFRICAN AMERICAN: >60
GFR NON-AFRICAN AMERICAN: 53 ML/MIN/1.73
GLUCOSE BLD-MCNC: 90 MG/DL (ref 74–99)
HDLC SERPL-MCNC: 37 MG/DL
LDL CHOLESTEROL CALCULATED: 62 MG/DL (ref 0–99)
POTASSIUM SERPL-SCNC: 4.7 MMOL/L (ref 3.5–5)
SODIUM BLD-SCNC: 140 MMOL/L (ref 132–146)
T4 FREE: 1.49 NG/DL (ref 0.93–1.7)
TOTAL PROTEIN: 7.5 G/DL (ref 6.4–8.3)
TRIGL SERPL-MCNC: 140 MG/DL (ref 0–149)
TSH SERPL DL<=0.05 MIU/L-ACNC: 2.63 UIU/ML (ref 0.27–4.2)
VLDLC SERPL CALC-MCNC: 28 MG/DL

## 2022-09-21 PROCEDURE — 1123F ACP DISCUSS/DSCN MKR DOCD: CPT | Performed by: INTERNAL MEDICINE

## 2022-09-21 PROCEDURE — 99214 OFFICE O/P EST MOD 30 MIN: CPT | Performed by: INTERNAL MEDICINE

## 2022-09-21 PROCEDURE — G0439 PPPS, SUBSEQ VISIT: HCPCS | Performed by: INTERNAL MEDICINE

## 2022-09-21 PROCEDURE — 90694 VACC AIIV4 NO PRSRV 0.5ML IM: CPT | Performed by: INTERNAL MEDICINE

## 2022-09-21 PROCEDURE — G0008 ADMIN INFLUENZA VIRUS VAC: HCPCS | Performed by: INTERNAL MEDICINE

## 2022-09-21 RX ORDER — LEVOTHYROXINE SODIUM 0.07 MG/1
TABLET ORAL
Qty: 150 TABLET | Refills: 3 | Status: CANCELLED | OUTPATIENT
Start: 2022-09-21

## 2022-09-21 RX ORDER — ATORVASTATIN CALCIUM 80 MG/1
TABLET, FILM COATED ORAL
Qty: 90 TABLET | Refills: 3 | Status: SHIPPED | OUTPATIENT
Start: 2022-09-21

## 2022-09-21 RX ORDER — METOPROLOL SUCCINATE 50 MG/1
TABLET, EXTENDED RELEASE ORAL
Qty: 180 TABLET | Refills: 3 | Status: SHIPPED | OUTPATIENT
Start: 2022-09-21

## 2022-09-21 RX ORDER — ACETAMINOPHEN 160 MG
1 TABLET,DISINTEGRATING ORAL DAILY
Qty: 90 CAPSULE | Refills: 3 | Status: CANCELLED | OUTPATIENT
Start: 2022-09-21

## 2022-09-21 RX ORDER — LEVOTHYROXINE SODIUM 0.07 MG/1
TABLET ORAL
Qty: 150 TABLET | Refills: 3 | Status: SHIPPED | OUTPATIENT
Start: 2022-09-21

## 2022-09-21 RX ORDER — MECLIZINE HYDROCHLORIDE 25 MG/1
25 TABLET ORAL DAILY PRN
Qty: 90 TABLET | Refills: 3 | Status: CANCELLED | OUTPATIENT
Start: 2022-09-21

## 2022-09-21 RX ORDER — FAMOTIDINE 20 MG/1
20 TABLET, FILM COATED ORAL 2 TIMES DAILY
Qty: 60 TABLET | Refills: 3 | Status: SHIPPED | OUTPATIENT
Start: 2022-09-21

## 2022-09-21 RX ORDER — ATORVASTATIN CALCIUM 80 MG/1
TABLET, FILM COATED ORAL
Qty: 90 TABLET | Refills: 3 | Status: CANCELLED | OUTPATIENT
Start: 2022-09-21

## 2022-09-21 RX ORDER — ACETAMINOPHEN 160 MG
1 TABLET,DISINTEGRATING ORAL DAILY
Qty: 90 CAPSULE | Refills: 3 | Status: SHIPPED | OUTPATIENT
Start: 2022-09-21

## 2022-09-21 RX ORDER — METOPROLOL SUCCINATE 50 MG/1
TABLET, EXTENDED RELEASE ORAL
Qty: 180 TABLET | Refills: 3 | Status: CANCELLED | OUTPATIENT
Start: 2022-09-21

## 2022-09-21 RX ORDER — MECLIZINE HYDROCHLORIDE 25 MG/1
25 TABLET ORAL DAILY PRN
Qty: 90 TABLET | Refills: 3 | Status: SHIPPED | OUTPATIENT
Start: 2022-09-21

## 2022-09-21 ASSESSMENT — PATIENT HEALTH QUESTIONNAIRE - PHQ9
SUM OF ALL RESPONSES TO PHQ QUESTIONS 1-9: 0
1. LITTLE INTEREST OR PLEASURE IN DOING THINGS: 0
SUM OF ALL RESPONSES TO PHQ QUESTIONS 1-9: 0
SUM OF ALL RESPONSES TO PHQ9 QUESTIONS 1 & 2: 0
SUM OF ALL RESPONSES TO PHQ QUESTIONS 1-9: 0
2. FEELING DOWN, DEPRESSED OR HOPELESS: 0
SUM OF ALL RESPONSES TO PHQ QUESTIONS 1-9: 0

## 2022-09-21 ASSESSMENT — ENCOUNTER SYMPTOMS
ANAL BLEEDING: 0
ABDOMINAL PAIN: 0
SORE THROAT: 0
COUGH: 0
EYE DISCHARGE: 0
DIARRHEA: 0
WHEEZING: 0
EYE ITCHING: 0
FACIAL SWELLING: 0
PHOTOPHOBIA: 0
COLOR CHANGE: 0
VOMITING: 0
CONSTIPATION: 0
ABDOMINAL DISTENTION: 0
BLOOD IN STOOL: 0
RHINORRHEA: 0
NAUSEA: 0
STRIDOR: 0
BACK PAIN: 0
EYE PAIN: 0
SHORTNESS OF BREATH: 0
TROUBLE SWALLOWING: 0

## 2022-09-21 ASSESSMENT — LIFESTYLE VARIABLES
HOW MANY STANDARD DRINKS CONTAINING ALCOHOL DO YOU HAVE ON A TYPICAL DAY: PATIENT DOES NOT DRINK
HOW OFTEN DO YOU HAVE A DRINK CONTAINING ALCOHOL: NEVER

## 2022-09-21 NOTE — PROGRESS NOTES
that her triglycerides are 157 but her LDL was 63 and total cholesterol was 128. She is not going to get the COVID-19 vaccine but she may change her mind. She is going to discuss this with her cardiologist. Mauricio Matute  She needs her flu shot    She had her eye examination done by Dr. Soren Gayle     She saw Dr. Jamie Bear for her melanoma surveillance. She has some sun exposure on her hands and the nape of her neck which caused some mild blistering suggestive of sun sensitivity. I recommended that she use an SPF 30 sunscreen on her hands and neck. Review of Systems   Constitutional:  Negative for appetite change, fatigue, fever and unexpected weight change. HENT:  Negative for congestion, ear pain, facial swelling, nosebleeds, rhinorrhea, sore throat, tinnitus and trouble swallowing. Hair loss   Eyes:  Negative for photophobia, pain, discharge, itching and visual disturbance. Respiratory:  Negative for cough, shortness of breath, wheezing and stridor. Cardiovascular:  Negative for chest pain, palpitations and leg swelling. Gastrointestinal:  Negative for abdominal distention, abdominal pain, anal bleeding, blood in stool, constipation, diarrhea, nausea and vomiting. Pyrosis and water brash   Endocrine: Negative for cold intolerance, heat intolerance, polydipsia, polyphagia and polyuria. Genitourinary:  Negative for difficulty urinating, dysuria, flank pain, frequency, hematuria and urgency. Musculoskeletal:  Negative for arthralgias, back pain, gait problem, joint swelling and myalgias. Skin:  Negative for color change, pallor and rash. Allergic/Immunologic: Negative for environmental allergies and food allergies. Neurological:  Negative for dizziness, tremors, seizures, syncope, speech difficulty, weakness, light-headedness, numbness and headaches. Hematological:  Negative for adenopathy. Does not bruise/bleed easily.    Psychiatric/Behavioral:  Negative for agitation, behavioral problems, confusion, sleep disturbance and suicidal ideas. The patient is not nervous/anxious. Current Outpatient Medications:     Cholecalciferol (VITAMIN D3) 50 MCG (2000 UT) CAPS, Take 1 capsule by mouth daily, Disp: 90 capsule, Rfl: 3    meclizine (ANTIVERT) 25 MG tablet, Take 1 tablet by mouth daily as needed for Dizziness, Disp: 90 tablet, Rfl: 3    levothyroxine (SYNTHROID) 75 MCG tablet, TAKE 1 TABLET BY MOUTH ONCE DAILY -ON  MONDAY,  TAKE 2 TABS, ON WED AND FRIDAYS, TAKE 1.5 TABS, Disp: 150 tablet, Rfl: 3    metoprolol succinate (TOPROL XL) 50 MG extended release tablet, Take 1 tablet by mouth twice daily, Disp: 180 tablet, Rfl: 3    atorvastatin (LIPITOR) 80 MG tablet, Take 1 tablet by mouth once daily, Disp: 90 tablet, Rfl: 3    famotidine (PEPCID) 20 MG tablet, Take 1 tablet by mouth 2 times daily, Disp: 60 tablet, Rfl: 3    Ascorbic Acid (VITAMIN C) 250 MG tablet, Take 250 mg by mouth daily, Disp: , Rfl:     zinc gluconate 50 MG tablet, Take 50 mg by mouth daily, Disp: , Rfl:     BIOTIN MAXIMUM PO, Take by mouth, Disp: , Rfl:     aspirin 81 MG tablet, Take 1 tablet by mouth daily, Disp: 90 tablet, Rfl: 5     Allergies   Allergen Reactions    Adhesive Tape Rash        Past Medical History:   Diagnosis Date    CAD (coronary artery disease)     Colon polyps     Flu vaccine need 10/21/2019    Hot flashes due to surgical menopause 7/22/2020    Hyperlipidemia     Hypothyroidism     Leg cramps, sleep related 1/2/2020    Melanoma ([facility]     Osteopenia        There are no preventive care reminders to display for this patient.        Patient Active Problem List   Diagnosis    Essential hypertension    Dizziness    Hypothyroidism    Hyperlipidemia    CAD (coronary artery disease)    Melanoma ([facility] [address]    Neck pain on left side    Flu vaccine need    Vitamin D deficiency    Abdominal distention    COVID-19 virus infection    Chronic fatigue    Chronic right-sided thoracic back pain    Gastroesophageal reflux disease        Past Surgical History:   Procedure Laterality Date     SECTION      CORONARY ARTERY BYPASS GRAFT  2014    LITHOTRIPSY      CHRISTIAN AND BSO (CERVIX REMOVED)          Family History   Problem Relation Age of Onset    Coronary Art Dis Mother     Other Mother         CVA    Heart Attack Father     Pancreatic Cancer Sister     Lung Cancer Sister         Social History     Tobacco Use    Smoking status: Never    Smokeless tobacco: Never   Substance Use Topics    Alcohol use: Never    Drug use: Never        Objective  Vitals:    22 1000   BP: 108/66   Pulse: 57   Temp: 97.3 °F (36.3 °C)   SpO2: 97%   Weight: 126 lb (57.2 kg)   Height: 5' 3\" (1.6 m)        Exam:  Physical Exam  Vitals reviewed. Constitutional:       General: She is not in acute distress. Appearance: Normal appearance. She is well-developed and normal weight. She is not ill-appearing. HENT:      Head: Normocephalic. Right Ear: External ear normal.      Left Ear: External ear normal.      Nose: Nose normal.   Eyes:      General: No scleral icterus. Right eye: No discharge. Left eye: No discharge. Conjunctiva/sclera: Conjunctivae normal.      Pupils: Pupils are equal, round, and reactive to light. Neck:      Thyroid: No thyromegaly. Comments: Questionable bruit heard at the base of the left side of her neck  Cardiovascular:      Rate and Rhythm: Normal rate and regular rhythm. Heart sounds: Normal heart sounds. No murmur heard. No friction rub. No gallop. Pulmonary:      Effort: Pulmonary effort is normal. No respiratory distress. Breath sounds: Normal breath sounds. No wheezing or rales. Chest:      Chest wall: No tenderness. Abdominal:      General: Bowel sounds are normal. There is no distension. Palpations: Abdomen is soft. There is no mass. Tenderness: There is no abdominal tenderness. There is no guarding or rebound.    Musculoskeletal:         General: No tenderness or deformity. Normal range of motion. Cervical back: Normal range of motion and neck supple. Lymphadenopathy:      Cervical: No cervical adenopathy. Skin:     General: Skin is warm and dry. Coloration: Skin is not pale. Findings: No erythema or rash. Neurological:      General: No focal deficit present. Mental Status: She is alert and oriented to person, place, and time. Cranial Nerves: No cranial nerve deficit. Sensory: No sensory deficit. Deep Tendon Reflexes: Reflexes normal.   Psychiatric:         Mood and Affect: Mood normal.         Behavior: Behavior normal.         Thought Content: Thought content normal.         Judgment: Judgment normal.        Last labs reviewed. ASSESSMENT & PLAN :   Problem List          Circulatory    Essential hypertension - Primary     Blood pressures are stable. Continue medications and monitor blood pressures at home. Call office if systolics are over 754 over diastolics over 90.  check fasting CMP prescription for metoprolol succinate written         Relevant Medications    metoprolol succinate (TOPROL XL) 50 MG extended release tablet    Other Relevant Orders    Comprehensive Metabolic Panel       Digestive    Gastroesophageal reflux disease       trial famotidine 20 mg twice daily, prescription written. If this does not improve her symptoms we will need to do a GI work-up. Avoid NSAID's. Other reflux measures given such as not eating before lying down and blocking the head of the bed up 4 inches         Relevant Medications    meclizine (ANTIVERT) 25 MG tablet    famotidine (PEPCID) 20 MG tablet       Endocrine    Hypothyroidism    Relevant Medications    levothyroxine (SYNTHROID) 75 MCG tablet    Other Relevant Orders    T4, Free    TSH       Other    Dizziness       very rare but she likes to keep  meclizine on hand.   Prescription written         Relevant Medications    meclizine (ANTIVERT) 25 MG tablet Hyperlipidemia     Watch saturated fats in diet and will monitor lipids  continue atorvastatin 80 mg daily, prescription written         Relevant Medications    aspirin 81 MG tablet    metoprolol succinate (TOPROL XL) 50 MG extended release tablet    atorvastatin (LIPITOR) 80 MG tablet    Other Relevant Orders    Lipid Panel    Flu vaccine need    Vitamin D deficiency       continue supplemental vitamin D, prescription written         Relevant Medications    Cholecalciferol (VITAMIN D3) 50 MCG (2000 UT) CAPS        Return in about 3 months (around 12/21/2022), or HTN, HL.        Papi Lloyd,   9/21/2022

## 2022-09-21 NOTE — ASSESSMENT & PLAN NOTE
Watch saturated fats in diet and will monitor lipids  continue atorvastatin 80 mg daily, prescription written

## 2022-09-21 NOTE — ASSESSMENT & PLAN NOTE
trial famotidine 20 mg twice daily, prescription written. If this does not improve her symptoms we will need to do a GI work-up. Avoid NSAID's.   Other reflux measures given such as not eating before lying down and blocking the head of the bed up 4 inches

## 2022-09-21 NOTE — ASSESSMENT & PLAN NOTE
Blood pressures are stable. Continue medications and monitor blood pressures at home.  Call office if systolics are over 264 over diastolics over 90.  check fasting CMP prescription for metoprolol succinate written

## 2022-09-21 NOTE — PROGRESS NOTES
Medicare Annual Wellness Visit    Pau Harrington is here for Medicare AWV    Assessment & Plan   Vitamin D deficiency  Acquired hypothyroidism  Essential hypertension  Mixed hyperlipidemia    Recommendations for Preventive Services Due: see orders and patient instructions/AVS.  Recommended screening schedule for the next 5-10 years is provided to the patient in written form: see Patient Instructions/AVS.     No follow-ups on file. Subjective       Patient's complete Health Risk Assessment and screening values have been reviewed and are found in Flowsheets. Positive Risk Factor Screenings with Interventions:             General Health and ACP:  General  In general, how would you say your health is?: Very Good  In the past 7 days, have you experienced any of the following: New or Increased Pain, New or Increased Fatigue, Loneliness, Social Isolation, Stress or Anger?: No  Do you get the social and emotional support that you need?: Yes  Do you have a Living Will?: Yes    Advance Directives       Power of  Living Will ACP-Advance Directive ACP-Power of     Not on File Not on File Not on File Not on File        General Health Risk Interventions:  No Living Will: ACP documents already completed- patient asked to provide copy to the office      Safety:  Do you have working smoke detectors?: Yes  Do you have any tripping hazards - loose or unsecured carpets or rugs?: No  Do you have any tripping hazards - clutter in doorways, halls, or stairs?: No  Do you have either shower bars, grab bars, non-slip mats or non-slip surfaces in your shower or bathtub?: (!) No  Do all of your stairways have a railing or banister?: Not Applicable  Do you always fasten your seatbelt when you are in a car?: Yes  Safety Interventions:   Will have grab bars installed in bathroom           Objective   Vitals:    09/21/22 1019   BP: 108/66   Pulse: 57   Temp: 97.3 °F (36.3 °C)   SpO2: 97%   Weight: 126 lb (57.2 kg)   Height: 5' 3\" (1.6 m)      Body mass index is 22.32 kg/m². Allergies   Allergen Reactions    Adhesive Tape Rash     Prior to Visit Medications    Medication Sig Taking? Authorizing Provider   atorvastatin (LIPITOR) 80 MG tablet Take 1 tablet by mouth once daily Yes Aditi Bailey DO   metoprolol succinate (TOPROL XL) 50 MG extended release tablet Take 1 tablet by mouth twice daily Yes Aditi Bailey DO   levothyroxine (SYNTHROID) 75 MCG tablet TAKE 1 TABLET BY MOUTH ONCE DAILY -ON  MONDAY,  TAKE 2 TABS, ON WED AND FRIDAYS, TAKE 1.5 TABS Yes Aditi Bailey DO   Ascorbic Acid (VITAMIN C) 250 MG tablet Take 250 mg by mouth daily Yes Historical Provider, MD   zinc gluconate 50 MG tablet Take 50 mg by mouth daily Yes Historical Provider, MD   Cholecalciferol (VITAMIN D3) 50 MCG (2000 UT) CAPS Take 1 capsule by mouth daily Yes Aditi Bailey DO   meclizine (ANTIVERT) 25 MG tablet Take 1 tablet by mouth daily as needed for Dizziness Yes Aditi Bailey DO   BIOTIN MAXIMUM PO Take by mouth Yes Historical Provider, MD   aspirin 81 MG tablet Take 1 tablet by mouth daily Yes Aditi Bailey DO       CareTeam (Including outside providers/suppliers regularly involved in providing care):   Patient Care Team:  Sascha Celaya DO as PCP - General (Internal Medicine)  Sascha Celaya DO as PCP - [facility] Empaneled Provider     Reviewed and updated this visit:  Allergies  Meds              Cardiovascular Disease Risk Counseling: Assessed the patient's risk to develop cardiovascular disease and reviewed main risk factors.    Reviewed steps to reduce disease risk including:   Quitting tobacco use, reducing amount smoked, or not starting the habit  Making healthy food choices  Being physically active and gradualy increasing activity levels   Reduce weight and determine a healthy BMI goal  Monitor blood pressure and treat if higher than 140/90 mmHg  Maintain blood total cholesterol levels under 5 mmol/l or 190 mg/dl  Maintain LDL cholesterol levels under 3.0 mmol/l or 115 mg/dl   Control blood glucose levels  Consider taking aspirin (75 mg daily), once blood pressure is controlled   Provided a follow up plan.   Time spent (minutes): 5

## 2022-09-21 NOTE — PATIENT INSTRUCTIONS
Personalized Preventive Plan for [facility] - 9/21/2022  Medicare offers a range of preventive health benefits. Some of the tests and screenings are paid in full while other may be subject to a deductible, co-insurance, and/or copay. Some of these benefits include a comprehensive review of your medical history including lifestyle, illnesses that may run in your family, and various assessments and screenings as appropriate. After reviewing your medical record and screening and assessments performed today your provider may have ordered immunizations, labs, imaging, and/or referrals for you. A list of these orders (if applicable) as well as your Preventive Care list are included within your After Visit Summary for your review. Other Preventive Recommendations:    A preventive eye exam performed by an eye specialist is recommended every 1-2 years to screen for glaucoma; cataracts, macular degeneration, and other eye disorders. A preventive dental visit is recommended every 6 months. Try to get at least 150 minutes of exercise per week or 10,000 steps per day on a pedometer . Order or download the FREE \"Exercise & Physical Activity: Your Everyday Guide\" from [employer]. Call 4-723.105.8643 or search [employer] online. You need 5603-0702 mg of calcium and 0914-5237 IU of vitamin D per day. It is possible to meet your calcium requirement with diet alone, but a vitamin D supplement is usually necessary to meet this goal.  When exposed to the sun, use a sunscreen that protects against both UVA and UVB radiation with an SPF of 30 or greater. Reapply every 2 to 3 hours or after sweating, drying off with a towel, or swimming. Always wear a seat belt when traveling in a car. Always wear a helmet when riding a bicycle or motorcycle.

## 2022-10-07 NOTE — TELEPHONE ENCOUNTER
Last Appointment:  3/22/2022  Future Appointments   Date Time Provider Lee Ann Cash   [facility]22 [facility]   9/21/2022 10:00 AM Candida Parker 132 Melolabial Transposition Flap Text: The defect edges were debeveled with a #15 scalpel blade.  Given the location of the defect and the proximity to free margins a melolabial flap was deemed most appropriate.  Using a sterile surgical marker, an appropriate melolabial transposition flap was drawn incorporating the defect.    The area thus outlined was incised deep to adipose tissue with a #15 scalpel blade.  The skin margins were undermined to an appropriate distance in all directions utilizing iris scissors.

## 2022-10-19 NOTE — TELEPHONE ENCOUNTER
Attempted to call doctor office at Phone: [phone] pressing option 5 for medical records, phone just rang. So I then sent a  Fax: to  [phone]. Requesting records for both this patient and spouse. (4) no limitation

## 2022-11-15 DIAGNOSIS — K21.9 GASTROESOPHAGEAL REFLUX DISEASE, UNSPECIFIED WHETHER ESOPHAGITIS PRESENT: ICD-10-CM

## 2022-11-15 RX ORDER — FAMOTIDINE 20 MG/1
20 TABLET, FILM COATED ORAL 2 TIMES DAILY
Qty: 180 TABLET | Refills: 1 | Status: SHIPPED | OUTPATIENT
Start: 2022-11-15

## 2022-12-21 ENCOUNTER — OFFICE VISIT (OUTPATIENT)
Dept: PRIMARY CARE CLINIC | Age: 82
End: 2022-12-21
Payer: MEDICARE

## 2022-12-21 VITALS
BODY MASS INDEX: 22.32 KG/M2 | TEMPERATURE: 97.6 F | OXYGEN SATURATION: 99 % | HEIGHT: 63 IN | DIASTOLIC BLOOD PRESSURE: 72 MMHG | SYSTOLIC BLOOD PRESSURE: 128 MMHG | WEIGHT: 126 LBS | HEART RATE: 65 BPM

## 2022-12-21 DIAGNOSIS — K21.9 GASTROESOPHAGEAL REFLUX DISEASE, UNSPECIFIED WHETHER ESOPHAGITIS PRESENT: ICD-10-CM

## 2022-12-21 DIAGNOSIS — I10 ESSENTIAL HYPERTENSION: Primary | ICD-10-CM

## 2022-12-21 DIAGNOSIS — E78.2 MIXED HYPERLIPIDEMIA: ICD-10-CM

## 2022-12-21 DIAGNOSIS — E03.9 ACQUIRED HYPOTHYROIDISM: ICD-10-CM

## 2022-12-21 DIAGNOSIS — I10 ESSENTIAL HYPERTENSION: ICD-10-CM

## 2022-12-21 DIAGNOSIS — N18.31 STAGE 3A CHRONIC KIDNEY DISEASE (HCC): ICD-10-CM

## 2022-12-21 PROBLEM — U07.1 COVID-19 VIRUS INFECTION: Status: RESOLVED | Noted: 2021-01-25 | Resolved: 2022-12-21

## 2022-12-21 PROBLEM — Z23 FLU VACCINE NEED: Status: RESOLVED | Noted: 2019-10-21 | Resolved: 2022-12-21

## 2022-12-21 PROBLEM — R14.0 ABDOMINAL DISTENTION: Status: RESOLVED | Noted: 2020-07-22 | Resolved: 2022-12-21

## 2022-12-21 PROBLEM — R53.82 CHRONIC FATIGUE: Status: RESOLVED | Noted: 2021-06-14 | Resolved: 2022-12-21

## 2022-12-21 PROBLEM — R42 DIZZINESS: Status: RESOLVED | Noted: 2019-07-15 | Resolved: 2022-12-21

## 2022-12-21 LAB
ALBUMIN SERPL-MCNC: 4.2 G/DL (ref 3.5–5.2)
ALP BLD-CCNC: 110 U/L (ref 35–104)
ALT SERPL-CCNC: 20 U/L (ref 0–32)
ANION GAP SERPL CALCULATED.3IONS-SCNC: 10 MMOL/L (ref 7–16)
AST SERPL-CCNC: 22 U/L (ref 0–31)
BASOPHILS ABSOLUTE: 0.05 E9/L (ref 0–0.2)
BASOPHILS RELATIVE PERCENT: 0.6 % (ref 0–2)
BILIRUB SERPL-MCNC: 0.4 MG/DL (ref 0–1.2)
BUN BLDV-MCNC: 21 MG/DL (ref 6–23)
CALCIUM SERPL-MCNC: 9.9 MG/DL (ref 8.6–10.2)
CHLORIDE BLD-SCNC: 105 MMOL/L (ref 98–107)
CO2: 26 MMOL/L (ref 22–29)
CREAT SERPL-MCNC: 0.9 MG/DL (ref 0.5–1)
EOSINOPHILS ABSOLUTE: 0.26 E9/L (ref 0.05–0.5)
EOSINOPHILS RELATIVE PERCENT: 3.2 % (ref 0–6)
GFR SERPL CREATININE-BSD FRML MDRD: >60 ML/MIN/1.73
GLUCOSE BLD-MCNC: 97 MG/DL (ref 74–99)
HCT VFR BLD CALC: 40.3 % (ref 34–48)
HEMOGLOBIN: 12.8 G/DL (ref 11.5–15.5)
IMMATURE GRANULOCYTES #: 0.03 E9/L
IMMATURE GRANULOCYTES %: 0.4 % (ref 0–5)
LYMPHOCYTES ABSOLUTE: 1.84 E9/L (ref 1.5–4)
LYMPHOCYTES RELATIVE PERCENT: 23 % (ref 20–42)
MCH RBC QN AUTO: 30.7 PG (ref 26–35)
MCHC RBC AUTO-ENTMCNC: 31.8 % (ref 32–34.5)
MCV RBC AUTO: 96.6 FL (ref 80–99.9)
MONOCYTES ABSOLUTE: 0.92 E9/L (ref 0.1–0.95)
MONOCYTES RELATIVE PERCENT: 11.5 % (ref 2–12)
NEUTROPHILS ABSOLUTE: 4.91 E9/L (ref 1.8–7.3)
NEUTROPHILS RELATIVE PERCENT: 61.3 % (ref 43–80)
PDW BLD-RTO: 12.9 FL (ref 11.5–15)
PLATELET # BLD: 225 E9/L (ref 130–450)
PMV BLD AUTO: 10.5 FL (ref 7–12)
POTASSIUM SERPL-SCNC: 5 MMOL/L (ref 3.5–5)
RBC # BLD: 4.17 E12/L (ref 3.5–5.5)
SODIUM BLD-SCNC: 141 MMOL/L (ref 132–146)
TOTAL PROTEIN: 7.3 G/DL (ref 6.4–8.3)
WBC # BLD: 8 E9/L (ref 4.5–11.5)

## 2022-12-21 PROCEDURE — 3078F DIAST BP <80 MM HG: CPT | Performed by: INTERNAL MEDICINE

## 2022-12-21 PROCEDURE — 3074F SYST BP LT 130 MM HG: CPT | Performed by: INTERNAL MEDICINE

## 2022-12-21 PROCEDURE — 1123F ACP DISCUSS/DSCN MKR DOCD: CPT | Performed by: INTERNAL MEDICINE

## 2022-12-21 PROCEDURE — 99214 OFFICE O/P EST MOD 30 MIN: CPT | Performed by: INTERNAL MEDICINE

## 2022-12-21 RX ORDER — FAMOTIDINE 20 MG/1
20 TABLET, FILM COATED ORAL 2 TIMES DAILY
Qty: 180 TABLET | Refills: 1 | Status: SHIPPED | OUTPATIENT
Start: 2022-12-21

## 2022-12-21 ASSESSMENT — ENCOUNTER SYMPTOMS
SHORTNESS OF BREATH: 0
FACIAL SWELLING: 0
NAUSEA: 0
ABDOMINAL DISTENTION: 0
DIARRHEA: 0
RHINORRHEA: 0
ABDOMINAL PAIN: 0
TROUBLE SWALLOWING: 0
EYE ITCHING: 0
BLOOD IN STOOL: 0
SORE THROAT: 0
EYE DISCHARGE: 0
ANAL BLEEDING: 0
STRIDOR: 0
PHOTOPHOBIA: 0
BACK PAIN: 0
COUGH: 0
WHEEZING: 0
COLOR CHANGE: 0
EYE PAIN: 0
CONSTIPATION: 0
VOMITING: 0

## 2022-12-21 NOTE — ASSESSMENT & PLAN NOTE
improved, still has some breakthrough pyrosis and eructation. Much better on the famotidine.   Prescription given for famotidine 20 mg twice daily

## 2022-12-21 NOTE — PROGRESS NOTES
2022    Name: Lamin Lew : 1940 Sex: female  Age: 79 y.o. Subjective:  No chief complaint on file. Hypertension  Pertinent negatives include no chest pain, headaches, palpitations or shortness of breath. She has a history of coronary artery disease, hypertension, hypothyroidism and intermittent dizziness. Medications reviewed. No change from previous. She sees Dr. Laurent Silva go on a regular basis. Reviewed his reports. Chest x-ray was done which showed hyperinflation suggestive of COPD. Patient is a never smoker however she was exposed to a lot of fumes as well as secondhand smoke. She has no symptoms suggestive of COPD such as shortness of breath, wheezing, cough. We discussed getting pulmonary function test but she says she has no symptoms associated defers this. She occasionally has reflux symptoms consisting of pyrosis and water brash. We started her on famotidine 20 mg twice daily and her symptoms are almost completely gone. She would like to remain on this for a while. Bone density scan done in 2021 showed osteopenia with the lowest T score of -1.8 in the hip. This was a significant decrease from previous examination on her FRAX score was elevated at 4.7% for 10-year risk of hip fracture. She is on calcium and vitamin D3. .  We will repeat bone density score next year. Mammogram in 2022 showed breast artery calcifications and a postbiopsy clip in the left breast with no growth. No change from previous and no mammographic evidence of malignancy. We kept her levothyroxine and 75 mcg a day with an extra half a pill on Wednesday and Friday only. Free T4 and TSH was rechecked in 2022 and it was normal.  We will keep her on the same dose. In 2022 her repeat potassium was normal.      CMP showed that her estimated GFR is down to 53 with a creatinine of 1. We will recheck this.   1 liver enzyme, alkaline phosphatase was slightly elevated but better than it was before. Lipids show her total cholesterol 127, triglycerides 140, HDL 37 and LDL 62. She remains on her statin. She is not going to get the COVID-19 vaccine but she may change her mind. She is going to discuss this with her cardiologist. Tish Matute  She needs her flu shot    She had her eye examination done by Dr. Sara Woods     She saw Dr. Carlie Phoenix for her melanoma surveillance. She has some sun exposure on her hands and the nape of her neck which caused some mild blistering suggestive of sun sensitivity. I recommended that she use an SPF 30 sunscreen on her hands and neck. Review of Systems   Constitutional:  Negative for appetite change, fatigue, fever and unexpected weight change. HENT:  Negative for congestion, ear pain, facial swelling, nosebleeds, rhinorrhea, sore throat, tinnitus and trouble swallowing. Hair loss   Eyes:  Negative for photophobia, pain, discharge, itching and visual disturbance. Respiratory:  Negative for cough, shortness of breath, wheezing and stridor. Cardiovascular:  Negative for chest pain, palpitations and leg swelling. Gastrointestinal:  Negative for abdominal distention, abdominal pain, anal bleeding, blood in stool, constipation, diarrhea, nausea and vomiting. Pyrosis and water brash   Endocrine: Negative for cold intolerance, heat intolerance, polydipsia, polyphagia and polyuria. Genitourinary:  Negative for difficulty urinating, dysuria, flank pain, frequency, hematuria and urgency. Musculoskeletal:  Negative for arthralgias, back pain, gait problem, joint swelling and myalgias. Skin:  Negative for color change, pallor and rash. Allergic/Immunologic: Negative for environmental allergies and food allergies. Neurological:  Negative for dizziness, tremors, seizures, syncope, speech difficulty, weakness, light-headedness, numbness and headaches. Hematological:  Negative for adenopathy.  Does not bruise/bleed easily. Psychiatric/Behavioral:  Negative for agitation, behavioral problems, confusion, sleep disturbance and suicidal ideas. The patient is not nervous/anxious. Current Outpatient Medications:     famotidine (PEPCID) 20 MG tablet, Take 1 tablet by mouth 2 times daily, Disp: 180 tablet, Rfl: 1    Cholecalciferol (VITAMIN D3) 50 MCG (2000 UT) CAPS, Take 1 capsule by mouth daily, Disp: 90 capsule, Rfl: 3    meclizine (ANTIVERT) 25 MG tablet, Take 1 tablet by mouth daily as needed for Dizziness, Disp: 90 tablet, Rfl: 3    levothyroxine (SYNTHROID) 75 MCG tablet, TAKE 1 TABLET BY MOUTH ONCE DAILY -ON  MONDAY,  TAKE 2 TABS, ON WED AND FRIDAYS, TAKE 1.5 TABS, Disp: 150 tablet, Rfl: 3    metoprolol succinate (TOPROL XL) 50 MG extended release tablet, Take 1 tablet by mouth twice daily, Disp: 180 tablet, Rfl: 3    atorvastatin (LIPITOR) 80 MG tablet, Take 1 tablet by mouth once daily, Disp: 90 tablet, Rfl: 3    Ascorbic Acid (VITAMIN C) 250 MG tablet, Take 250 mg by mouth daily, Disp: , Rfl:     BIOTIN MAXIMUM PO, Take by mouth, Disp: , Rfl:     aspirin 81 MG tablet, Take 1 tablet by mouth daily, Disp: 90 tablet, Rfl: 5     Allergies   Allergen Reactions    Adhesive Tape Rash        Past Medical History:   Diagnosis Date    CAD (coronary artery disease)     Colon polyps     Flu vaccine need 10/21/2019    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        Patient Active Problem List   Diagnosis    Essential hypertension    Hypothyroidism    Hyperlipidemia    CAD (coronary artery disease)    Melanoma ([address]    Neck pain on left side    Vitamin D deficiency    Chronic right-sided thoracic back pain    Gastroesophageal reflux disease    Stage 3a chronic kidney disease ([facility] [address]        Past Surgical History:   Procedure Laterality Date  SECTION      CORONARY ARTERY BYPASS GRAFT  2014    LITHOTRIPSY      CHRISTIAN AND BSO (CERVIX REMOVED)          Family History   Problem Relation Age of Onset    Coronary Art Dis Mother     Other Mother         CVA    Heart Attack Father     Pancreatic Cancer Sister     Lung Cancer Sister         Social History     Tobacco Use    Smoking status: Never    Smokeless tobacco: Never   Substance Use Topics    Alcohol use: Never    Drug use: Never        Objective  Vitals:    22 0949   BP: 128/72   Pulse: 65   Temp: 97.6 °F (36.4 °C)   SpO2: 99%   Weight: 126 lb (57.2 kg)   Height: 5' 3\" (1.6 m)        Exam:  Physical Exam  Vitals reviewed. Constitutional:       General: She is not in acute distress. Appearance: Normal appearance. She is well-developed and normal weight. She is not ill-appearing. HENT:      Head: Normocephalic. Right Ear: External ear normal.      Left Ear: External ear normal.      Nose: Nose normal.   Eyes:      General: No scleral icterus. Right eye: No discharge. Left eye: No discharge. Conjunctiva/sclera: Conjunctivae normal.      Pupils: Pupils are equal, round, and reactive to light. Neck:      Thyroid: No thyromegaly. Comments: Questionable bruit heard at the base of the left side of her neck  Cardiovascular:      Rate and Rhythm: Normal rate and regular rhythm. Heart sounds: Normal heart sounds. No murmur heard. No friction rub. No gallop. Pulmonary:      Effort: Pulmonary effort is normal. No respiratory distress. Breath sounds: Normal breath sounds. No wheezing or rales. Chest:      Chest wall: No tenderness. Abdominal:      General: Bowel sounds are normal. There is no distension. Palpations: Abdomen is soft. There is no mass. Tenderness: There is no abdominal tenderness. There is no guarding or rebound. Musculoskeletal:         General: No tenderness or deformity. Normal range of motion.       Cervical back: Normal range of motion and neck supple. Lymphadenopathy:      Cervical: No cervical adenopathy. Skin:     General: Skin is warm and dry. Coloration: Skin is not pale. Findings: No erythema or rash. Neurological:      General: No focal deficit present. Mental Status: She is alert and oriented to person, place, and time. Cranial Nerves: No cranial nerve deficit. Sensory: No sensory deficit. Deep Tendon Reflexes: Reflexes normal.   Psychiatric:         Mood and Affect: Mood normal.         Behavior: Behavior normal.         Thought Content: Thought content normal.         Judgment: Judgment normal.        Last labs reviewed. ASSESSMENT & PLAN :   Problem List          Circulatory    Essential hypertension - Primary       at goal, continue metoprolol succinate 50 mg daily and monitor her blood pressures at home and let me know if they are elevated over 140/86         Relevant Medications    metoprolol succinate (TOPROL XL) 50 MG extended release tablet    Other Relevant Orders    Comprehensive Metabolic Panel       Digestive    Gastroesophageal reflux disease       improved, still has some breakthrough pyrosis and eructation. Much better on the famotidine. Prescription given for famotidine 20 mg twice daily         Relevant Medications    meclizine (ANTIVERT) 25 MG tablet    famotidine (PEPCID) 20 MG tablet       Endocrine    Hypothyroidism       TSH and free T4 were normal so stay on her levothyroxine 75 mcg daily with an extra half on Wednesday and Friday only. Relevant Medications    levothyroxine (SYNTHROID) 75 MCG tablet       Genitourinary    Stage 3a chronic kidney disease (HCC)       stable. Avoid NSAID's, make sure she is adequately hydrated and check CBC and CMP to make sure is not worsening.   If it is then referral to nephrology will be considered         Relevant Orders    CBC with Auto Differential    Comprehensive Metabolic Panel       Other    Hyperlipidemia at goal.  Lipid profile was acceptable. Continue atorvastatin 40 mg daily         Relevant Medications    aspirin 81 MG tablet    metoprolol succinate (TOPROL XL) 50 MG extended release tablet    atorvastatin (LIPITOR) 80 MG tablet        Return in about 3 months (around 3/21/2023), or htn, hl, hypothyroidism, be fasting.        Mimi Whitney DO  12/21/2022

## 2022-12-21 NOTE — ASSESSMENT & PLAN NOTE
stable. Avoid NSAID's, make sure she is adequately hydrated and check CBC and CMP to make sure is not worsening.   If it is then referral to nephrology will be considered

## 2022-12-21 NOTE — ASSESSMENT & PLAN NOTE
at goal, continue metoprolol succinate 50 mg daily and monitor her blood pressures at home and let me know if they are elevated over 140/86

## 2023-04-11 PROBLEM — N18.31 STAGE 3A CHRONIC KIDNEY DISEASE (HCC): Status: RESOLVED | Noted: 2022-12-21 | Resolved: 2023-04-11

## 2023-07-12 ENCOUNTER — OFFICE VISIT (OUTPATIENT)
Dept: PRIMARY CARE CLINIC | Age: 83
End: 2023-07-12
Payer: MEDICARE

## 2023-07-12 VITALS
DIASTOLIC BLOOD PRESSURE: 64 MMHG | BODY MASS INDEX: 22.02 KG/M2 | OXYGEN SATURATION: 96 % | HEART RATE: 59 BPM | HEIGHT: 64 IN | WEIGHT: 129 LBS | SYSTOLIC BLOOD PRESSURE: 108 MMHG | TEMPERATURE: 97.5 F

## 2023-07-12 DIAGNOSIS — I10 ESSENTIAL HYPERTENSION: Primary | ICD-10-CM

## 2023-07-12 DIAGNOSIS — R10.30 LOWER ABDOMINAL PAIN: ICD-10-CM

## 2023-07-12 DIAGNOSIS — K21.9 GASTROESOPHAGEAL REFLUX DISEASE, UNSPECIFIED WHETHER ESOPHAGITIS PRESENT: ICD-10-CM

## 2023-07-12 DIAGNOSIS — R10.31 RIGHT LOWER QUADRANT ABDOMINAL PAIN: ICD-10-CM

## 2023-07-12 DIAGNOSIS — R14.0 ABDOMINAL BLOATING: ICD-10-CM

## 2023-07-12 DIAGNOSIS — R14.0 ABDOMINAL BLOATING: Primary | ICD-10-CM

## 2023-07-12 DIAGNOSIS — E78.2 MIXED HYPERLIPIDEMIA: ICD-10-CM

## 2023-07-12 DIAGNOSIS — E55.9 VITAMIN D DEFICIENCY: ICD-10-CM

## 2023-07-12 DIAGNOSIS — I10 ESSENTIAL HYPERTENSION: ICD-10-CM

## 2023-07-12 DIAGNOSIS — R42 DIZZINESS: ICD-10-CM

## 2023-07-12 DIAGNOSIS — E03.9 ACQUIRED HYPOTHYROIDISM: ICD-10-CM

## 2023-07-12 LAB
ALBUMIN SERPL-MCNC: 4.4 G/DL (ref 3.5–5.2)
ALP SERPL-CCNC: 118 U/L (ref 35–104)
ALT SERPL-CCNC: 21 U/L (ref 0–32)
ANION GAP SERPL CALCULATED.3IONS-SCNC: 13 MMOL/L (ref 7–16)
AST SERPL-CCNC: 25 U/L (ref 0–31)
BILIRUB SERPL-MCNC: 0.3 MG/DL (ref 0–1.2)
BUN SERPL-MCNC: 22 MG/DL (ref 6–23)
CALCIUM SERPL-MCNC: 9.4 MG/DL (ref 8.6–10.2)
CHLORIDE SERPL-SCNC: 106 MMOL/L (ref 98–107)
CHOLESTEROL, TOTAL: 119 MG/DL (ref 0–199)
CO2 SERPL-SCNC: 23 MMOL/L (ref 22–29)
CREAT SERPL-MCNC: 1 MG/DL (ref 0.5–1)
GLUCOSE SERPL-MCNC: 82 MG/DL (ref 74–99)
HDLC SERPL-MCNC: 34 MG/DL
LDLC SERPL CALC-MCNC: 54 MG/DL (ref 0–99)
POTASSIUM SERPL-SCNC: 5.2 MMOL/L (ref 3.5–5)
PROT SERPL-MCNC: 7.6 G/DL (ref 6.4–8.3)
SODIUM SERPL-SCNC: 142 MMOL/L (ref 132–146)
T4 FREE SERPL-MCNC: 1.54 NG/DL (ref 0.93–1.7)
TRIGL SERPL-MCNC: 156 MG/DL (ref 0–149)
TSH SERPL-MCNC: 4.64 UIU/ML (ref 0.27–4.2)
VLDLC SERPL CALC-MCNC: 31 MG/DL

## 2023-07-12 PROCEDURE — 3074F SYST BP LT 130 MM HG: CPT | Performed by: INTERNAL MEDICINE

## 2023-07-12 PROCEDURE — 3078F DIAST BP <80 MM HG: CPT | Performed by: INTERNAL MEDICINE

## 2023-07-12 PROCEDURE — 1123F ACP DISCUSS/DSCN MKR DOCD: CPT | Performed by: INTERNAL MEDICINE

## 2023-07-12 PROCEDURE — 99214 OFFICE O/P EST MOD 30 MIN: CPT | Performed by: INTERNAL MEDICINE

## 2023-07-12 RX ORDER — FAMOTIDINE 20 MG/1
20 TABLET, FILM COATED ORAL 2 TIMES DAILY
Qty: 180 TABLET | Refills: 1 | Status: SHIPPED | OUTPATIENT
Start: 2023-07-12

## 2023-07-12 RX ORDER — ACETAMINOPHEN 160 MG
1 TABLET,DISINTEGRATING ORAL DAILY
Qty: 90 CAPSULE | Refills: 3 | Status: SHIPPED | OUTPATIENT
Start: 2023-07-12

## 2023-07-12 RX ORDER — MECLIZINE HYDROCHLORIDE 25 MG/1
25 TABLET ORAL DAILY PRN
Qty: 90 TABLET | Refills: 3 | Status: SHIPPED | OUTPATIENT
Start: 2023-07-12

## 2023-07-12 RX ORDER — ATORVASTATIN CALCIUM 80 MG/1
TABLET, FILM COATED ORAL
Qty: 90 TABLET | Refills: 3 | Status: SHIPPED | OUTPATIENT
Start: 2023-07-12

## 2023-07-12 RX ORDER — LEVOTHYROXINE SODIUM 0.07 MG/1
TABLET ORAL
Qty: 150 TABLET | Refills: 3 | Status: SHIPPED | OUTPATIENT
Start: 2023-07-12

## 2023-07-12 RX ORDER — METOPROLOL SUCCINATE 50 MG/1
TABLET, EXTENDED RELEASE ORAL
Qty: 180 TABLET | Refills: 3 | Status: SHIPPED | OUTPATIENT
Start: 2023-07-12

## 2023-07-12 ASSESSMENT — ENCOUNTER SYMPTOMS
BACK PAIN: 0
VOMITING: 0
STRIDOR: 0
TROUBLE SWALLOWING: 0
COUGH: 0
DIARRHEA: 0
ABDOMINAL DISTENTION: 1
SHORTNESS OF BREATH: 0
ANAL BLEEDING: 0
SORE THROAT: 0
ABDOMINAL PAIN: 0
NAUSEA: 0
EYE PAIN: 0
EYE DISCHARGE: 0
EYE ITCHING: 0
BLOOD IN STOOL: 0
WHEEZING: 0
RHINORRHEA: 0
FACIAL SWELLING: 0
PHOTOPHOBIA: 0
CONSTIPATION: 0
COLOR CHANGE: 0

## 2023-07-12 NOTE — ASSESSMENT & PLAN NOTE
at goal.  Continue levothyroxine 75 mcg a day. Check TSH and free T4.   Prescription written for levothyroxine

## 2023-07-12 NOTE — ASSESSMENT & PLAN NOTE
check transabdominal ultrasound of her uterus at [facility].   Consider CT scan of her abdomen if unrevealing

## 2023-07-12 NOTE — ASSESSMENT & PLAN NOTE
not at goal.  Triglycerides elevated. Watch diet especially carbs and sweets. Continue atorvastatin 80 mg a day and check fasting lipid profile.   Prescription written for atorvastatin 80 mg

## 2023-07-12 NOTE — ASSESSMENT & PLAN NOTE
at goal.  Monitor blood pressures and continue her metoprolol succinate 50 mg a day. Prescription written.   Check fasting CMP

## 2023-07-19 DIAGNOSIS — Z12.31 BREAST CANCER SCREENING BY MAMMOGRAM: ICD-10-CM

## 2023-11-09 ENCOUNTER — OFFICE VISIT (OUTPATIENT)
Dept: PRIMARY CARE CLINIC | Age: 83
End: 2023-11-09

## 2023-11-09 VITALS
SYSTOLIC BLOOD PRESSURE: 130 MMHG | HEIGHT: 64 IN | BODY MASS INDEX: 22.71 KG/M2 | TEMPERATURE: 97.8 F | OXYGEN SATURATION: 95 % | WEIGHT: 133 LBS | RESPIRATION RATE: 18 BRPM | DIASTOLIC BLOOD PRESSURE: 70 MMHG | HEART RATE: 57 BPM

## 2023-11-09 DIAGNOSIS — I10 ESSENTIAL HYPERTENSION: Primary | ICD-10-CM

## 2023-11-09 DIAGNOSIS — Z23 NEED FOR INFLUENZA VACCINATION: ICD-10-CM

## 2023-11-09 DIAGNOSIS — K21.9 GASTROESOPHAGEAL REFLUX DISEASE, UNSPECIFIED WHETHER ESOPHAGITIS PRESENT: ICD-10-CM

## 2023-11-09 DIAGNOSIS — E03.9 ACQUIRED HYPOTHYROIDISM: ICD-10-CM

## 2023-11-09 DIAGNOSIS — E55.9 VITAMIN D DEFICIENCY: ICD-10-CM

## 2023-11-09 DIAGNOSIS — E78.2 MIXED HYPERLIPIDEMIA: ICD-10-CM

## 2023-11-09 DIAGNOSIS — R42 DIZZINESS: ICD-10-CM

## 2023-11-09 LAB — TSH SERPL DL<=0.05 MIU/L-ACNC: 3.77 UIU/ML (ref 0.27–4.2)

## 2023-11-09 RX ORDER — METOPROLOL SUCCINATE 50 MG/1
TABLET, EXTENDED RELEASE ORAL
Qty: 180 TABLET | Refills: 3 | Status: SHIPPED | OUTPATIENT
Start: 2023-11-09

## 2023-11-09 RX ORDER — ATORVASTATIN CALCIUM 80 MG/1
TABLET, FILM COATED ORAL
Qty: 90 TABLET | Refills: 3 | Status: SHIPPED | OUTPATIENT
Start: 2023-11-09

## 2023-11-09 RX ORDER — FAMOTIDINE 20 MG/1
20 TABLET, FILM COATED ORAL 2 TIMES DAILY
Qty: 180 TABLET | Refills: 1 | Status: SHIPPED | OUTPATIENT
Start: 2023-11-09

## 2023-11-09 RX ORDER — MULTIVIT WITH MINERALS/LUTEIN
250 TABLET ORAL DAILY
Qty: 30 TABLET | Refills: 3 | Status: SHIPPED | OUTPATIENT
Start: 2023-11-09

## 2023-11-09 RX ORDER — MECLIZINE HYDROCHLORIDE 25 MG/1
25 TABLET ORAL DAILY PRN
Qty: 90 TABLET | Refills: 3 | Status: SHIPPED | OUTPATIENT
Start: 2023-11-09

## 2023-11-09 RX ORDER — ACETAMINOPHEN 160 MG
1 TABLET,DISINTEGRATING ORAL DAILY
Qty: 90 CAPSULE | Refills: 3 | Status: SHIPPED | OUTPATIENT
Start: 2023-11-09

## 2023-11-10 DIAGNOSIS — E03.9 ACQUIRED HYPOTHYROIDISM: ICD-10-CM

## 2023-11-10 RX ORDER — LEVOTHYROXINE SODIUM 0.07 MG/1
TABLET ORAL
Qty: 150 TABLET | Refills: 3 | Status: SHIPPED | OUTPATIENT
Start: 2023-11-10

## 2024-01-03 ENCOUNTER — OFFICE VISIT (OUTPATIENT)
Dept: PRIMARY CARE CLINIC | Age: 84
End: 2024-01-03
Payer: MEDICARE

## 2024-01-03 VITALS
OXYGEN SATURATION: 96 % | HEIGHT: 64 IN | HEART RATE: 66 BPM | DIASTOLIC BLOOD PRESSURE: 64 MMHG | WEIGHT: 127 LBS | SYSTOLIC BLOOD PRESSURE: 124 MMHG | BODY MASS INDEX: 21.68 KG/M2 | TEMPERATURE: 98.4 F

## 2024-01-03 DIAGNOSIS — E03.9 ACQUIRED HYPOTHYROIDISM: ICD-10-CM

## 2024-01-03 DIAGNOSIS — N18.31 STAGE 3A CHRONIC KIDNEY DISEASE (HCC): ICD-10-CM

## 2024-01-03 DIAGNOSIS — I25.10 CORONARY ARTERY DISEASE INVOLVING NATIVE CORONARY ARTERY OF NATIVE HEART WITHOUT ANGINA PECTORIS: ICD-10-CM

## 2024-01-03 DIAGNOSIS — I10 ESSENTIAL HYPERTENSION: Primary | ICD-10-CM

## 2024-01-03 DIAGNOSIS — E78.2 MIXED HYPERLIPIDEMIA: ICD-10-CM

## 2024-01-03 PROCEDURE — 1123F ACP DISCUSS/DSCN MKR DOCD: CPT | Performed by: INTERNAL MEDICINE

## 2024-01-03 PROCEDURE — 99214 OFFICE O/P EST MOD 30 MIN: CPT | Performed by: INTERNAL MEDICINE

## 2024-01-03 PROCEDURE — 3078F DIAST BP <80 MM HG: CPT | Performed by: INTERNAL MEDICINE

## 2024-01-03 PROCEDURE — 3074F SYST BP LT 130 MM HG: CPT | Performed by: INTERNAL MEDICINE

## 2024-01-03 ASSESSMENT — ENCOUNTER SYMPTOMS
SHORTNESS OF BREATH: 0
CONSTIPATION: 0
BLOOD IN STOOL: 0
NAUSEA: 0
DIARRHEA: 0
ANAL BLEEDING: 0
PHOTOPHOBIA: 0
RHINORRHEA: 0
ABDOMINAL PAIN: 0
WHEEZING: 0
ABDOMINAL DISTENTION: 0
TROUBLE SWALLOWING: 0
SORE THROAT: 0
VOMITING: 0
BACK PAIN: 0
EYE DISCHARGE: 0
STRIDOR: 0
COLOR CHANGE: 0
FACIAL SWELLING: 0
EYE PAIN: 0
COUGH: 0
EYE ITCHING: 0

## 2024-01-03 ASSESSMENT — PATIENT HEALTH QUESTIONNAIRE - PHQ9
SUM OF ALL RESPONSES TO PHQ QUESTIONS 1-9: 0
2. FEELING DOWN, DEPRESSED OR HOPELESS: 0
SUM OF ALL RESPONSES TO PHQ QUESTIONS 1-9: 0
1. LITTLE INTEREST OR PLEASURE IN DOING THINGS: 0
SUM OF ALL RESPONSES TO PHQ QUESTIONS 1-9: 0
SUM OF ALL RESPONSES TO PHQ9 QUESTIONS 1 & 2: 0
SUM OF ALL RESPONSES TO PHQ QUESTIONS 1-9: 0

## 2024-01-03 NOTE — ASSESSMENT & PLAN NOTE
not at goal but close enough.  Triglycerides 156, LDL 54.  Continues atorvastatin 80 mg a day and watch diet.  Recheck lipid profile

## 2024-01-03 NOTE — PROGRESS NOTES
Orders    Comprehensive Metabolic Panel       Other    Hyperlipidemia       not at goal but close enough.  Triglycerides 156, LDL 54.  Continues atorvastatin 80 mg a day and watch diet.  Recheck lipid profile         Relevant Medications    aspirin 81 MG tablet    metoprolol succinate (TOPROL XL) 50 MG extended release tablet    atorvastatin (LIPITOR) 80 MG tablet    Other Relevant Orders    Lipid Panel        Return in about 6 months (around 7/3/2024), or Dr Ngo.       Aditi Bailey, DO  1/3/2024

## 2024-01-11 DIAGNOSIS — E03.9 ACQUIRED HYPOTHYROIDISM: ICD-10-CM

## 2024-01-11 DIAGNOSIS — E78.2 MIXED HYPERLIPIDEMIA: ICD-10-CM

## 2024-01-11 DIAGNOSIS — N18.31 STAGE 3A CHRONIC KIDNEY DISEASE (HCC): ICD-10-CM

## 2024-01-11 DIAGNOSIS — I10 ESSENTIAL HYPERTENSION: ICD-10-CM

## 2024-01-11 LAB
ALBUMIN SERPL-MCNC: 4.3 G/DL (ref 3.5–5.2)
ALP BLD-CCNC: 117 U/L (ref 35–104)
ALT SERPL-CCNC: 24 U/L (ref 0–32)
ANION GAP SERPL CALCULATED.3IONS-SCNC: 15 MMOL/L (ref 7–16)
AST SERPL-CCNC: 28 U/L (ref 0–31)
BILIRUB SERPL-MCNC: 0.3 MG/DL (ref 0–1.2)
BUN BLDV-MCNC: 21 MG/DL (ref 6–23)
CALCIUM SERPL-MCNC: 9.6 MG/DL (ref 8.6–10.2)
CHLORIDE BLD-SCNC: 103 MMOL/L (ref 98–107)
CHOLESTEROL: 116 MG/DL
CO2: 23 MMOL/L (ref 22–29)
CREAT SERPL-MCNC: 0.9 MG/DL (ref 0.5–1)
GFR SERPL CREATININE-BSD FRML MDRD: >60 ML/MIN/1.73M2
GLUCOSE BLD-MCNC: 90 MG/DL (ref 74–99)
HDLC SERPL-MCNC: 35 MG/DL
LDL CHOLESTEROL: 56 MG/DL
POTASSIUM SERPL-SCNC: 4.7 MMOL/L (ref 3.5–5)
SODIUM BLD-SCNC: 141 MMOL/L (ref 132–146)
T4 FREE: 1.6 NG/DL (ref 0.9–1.7)
TOTAL PROTEIN: 7.5 G/DL (ref 6.4–8.3)
TRIGL SERPL-MCNC: 124 MG/DL
TSH SERPL DL<=0.05 MIU/L-ACNC: 3.77 UIU/ML (ref 0.27–4.2)
VLDLC SERPL CALC-MCNC: 25 MG/DL

## 2024-06-24 ENCOUNTER — OFFICE VISIT (OUTPATIENT)
Dept: PRIMARY CARE CLINIC | Age: 84
End: 2024-06-24
Payer: MEDICARE

## 2024-06-24 VITALS
OXYGEN SATURATION: 96 % | DIASTOLIC BLOOD PRESSURE: 70 MMHG | RESPIRATION RATE: 16 BRPM | HEIGHT: 64 IN | HEART RATE: 60 BPM | BODY MASS INDEX: 22.36 KG/M2 | WEIGHT: 131 LBS | SYSTOLIC BLOOD PRESSURE: 126 MMHG

## 2024-06-24 DIAGNOSIS — K21.9 GASTROESOPHAGEAL REFLUX DISEASE, UNSPECIFIED WHETHER ESOPHAGITIS PRESENT: ICD-10-CM

## 2024-06-24 DIAGNOSIS — E55.9 VITAMIN D DEFICIENCY: ICD-10-CM

## 2024-06-24 DIAGNOSIS — E78.2 MIXED HYPERLIPIDEMIA: ICD-10-CM

## 2024-06-24 DIAGNOSIS — R42 DIZZINESS: ICD-10-CM

## 2024-06-24 DIAGNOSIS — I10 ESSENTIAL HYPERTENSION: ICD-10-CM

## 2024-06-24 DIAGNOSIS — R14.0 ABDOMINAL BLOATING: Primary | ICD-10-CM

## 2024-06-24 DIAGNOSIS — Z85.820 HISTORY OF MELANOMA: ICD-10-CM

## 2024-06-24 DIAGNOSIS — R63.5 WEIGHT GAIN: ICD-10-CM

## 2024-06-24 DIAGNOSIS — E03.9 ACQUIRED HYPOTHYROIDISM: ICD-10-CM

## 2024-06-24 PROCEDURE — 99215 OFFICE O/P EST HI 40 MIN: CPT | Performed by: FAMILY MEDICINE

## 2024-06-24 PROCEDURE — 3074F SYST BP LT 130 MM HG: CPT | Performed by: FAMILY MEDICINE

## 2024-06-24 PROCEDURE — G2211 COMPLEX E/M VISIT ADD ON: HCPCS | Performed by: FAMILY MEDICINE

## 2024-06-24 PROCEDURE — 1123F ACP DISCUSS/DSCN MKR DOCD: CPT | Performed by: FAMILY MEDICINE

## 2024-06-24 PROCEDURE — 3078F DIAST BP <80 MM HG: CPT | Performed by: FAMILY MEDICINE

## 2024-06-24 RX ORDER — FAMOTIDINE 20 MG/1
20 TABLET, FILM COATED ORAL 2 TIMES DAILY
Qty: 180 TABLET | Refills: 1 | Status: SHIPPED | OUTPATIENT
Start: 2024-06-24

## 2024-06-24 RX ORDER — METOPROLOL SUCCINATE 50 MG/1
TABLET, EXTENDED RELEASE ORAL
Qty: 180 TABLET | Refills: 3 | Status: SHIPPED | OUTPATIENT
Start: 2024-06-24

## 2024-06-24 RX ORDER — ACETAMINOPHEN 160 MG
1 TABLET,DISINTEGRATING ORAL DAILY
Qty: 90 CAPSULE | Refills: 3 | Status: SHIPPED | OUTPATIENT
Start: 2024-06-24

## 2024-06-24 RX ORDER — LEVOTHYROXINE SODIUM 0.07 MG/1
TABLET ORAL
Qty: 150 TABLET | Refills: 3 | Status: SHIPPED | OUTPATIENT
Start: 2024-06-24

## 2024-06-24 RX ORDER — MULTIVIT WITH MINERALS/LUTEIN
250 TABLET ORAL DAILY
Qty: 30 TABLET | Refills: 3 | Status: SHIPPED | OUTPATIENT
Start: 2024-06-24

## 2024-06-24 RX ORDER — ATORVASTATIN CALCIUM 80 MG/1
TABLET, FILM COATED ORAL
Qty: 90 TABLET | Refills: 3 | Status: SHIPPED | OUTPATIENT
Start: 2024-06-24

## 2024-06-24 RX ORDER — MECLIZINE HYDROCHLORIDE 25 MG/1
25 TABLET ORAL DAILY PRN
Qty: 90 TABLET | Refills: 3 | Status: SHIPPED | OUTPATIENT
Start: 2024-06-24

## 2024-06-24 SDOH — ECONOMIC STABILITY: FOOD INSECURITY: WITHIN THE PAST 12 MONTHS, YOU WORRIED THAT YOUR FOOD WOULD RUN OUT BEFORE YOU GOT MONEY TO BUY MORE.: NEVER TRUE

## 2024-06-24 SDOH — ECONOMIC STABILITY: INCOME INSECURITY: HOW HARD IS IT FOR YOU TO PAY FOR THE VERY BASICS LIKE FOOD, HOUSING, MEDICAL CARE, AND HEATING?: NOT HARD AT ALL

## 2024-06-24 NOTE — PROGRESS NOTES
24  Karina Cruz : 1940 Sex: female  Age: 83 y.o.      Assessment and Plan:  Karina was seen today for hypertension.    Diagnoses and all orders for this visit:    Abdominal bloating  -     Comprehensive Metabolic Panel; Future  -     CT ABDOMEN PELVIS WO CONTRAST Additional Contrast? Oral; Future  Check CTAP  Further recommendations pending results    Essential hypertension  -     metoprolol succinate (TOPROL XL) 50 MG extended release tablet; Take 1 tablet by mouth twice daily  Well controlled. Cont current treatment plan as documented below.    Dizziness  -     meclizine (ANTIVERT) 25 MG tablet; Take 1 tablet by mouth daily as needed for Dizziness  PRN usage     Acquired hypothyroidism  -     levothyroxine (SYNTHROID) 75 MCG tablet; TAKE 1 TABLET BY MOUTH ONCE DAILY -ON  MONDAY,  TAKE 2 TABS, ON WED AND , TAKE 1.5 TABS all other days  Stable. Cont current treatment as documented below.     Gastroesophageal reflux disease, unspecified whether esophagitis present  -     famotidine (PEPCID) 20 MG tablet; Take 1 tablet by mouth 2 times daily  Stable. Cont current treatment as documented below.     Vitamin D deficiency  -     Cholecalciferol (VITAMIN D3) 50 MCG ( UT) CAPS; Take 1 capsule by mouth daily    Mixed hyperlipidemia  -     atorvastatin (LIPITOR) 80 MG tablet; Take 1 tablet by mouth once daily  Well controlled. Cont current treatment plan as documented below.    Weight gain  -     Comprehensive Metabolic Panel; Future  -     CT ABDOMEN PELVIS WO CONTRAST Additional Contrast? Oral; Future  As above    History of melanoma  Continue regular follow-up with dermatology    Other orders  -     Ascorbic Acid (VITAMIN C) 250 MG tablet; Take 1 tablet by mouth daily    I have a longitudinal relationship with this patient and continued responsibility as the focal point for all needed services for his or her care.    Total time reviewing and updating the patient's chart as necessary before the

## 2024-06-25 ENCOUNTER — TELEPHONE (OUTPATIENT)
Dept: PRIMARY CARE CLINIC | Age: 84
End: 2024-06-25

## 2024-06-25 NOTE — TELEPHONE ENCOUNTER
----- Message from Leanne Meehan sent at 6/25/2024  1:36 PM EDT -----  Regarding: ECC Message to Provider  ECC Message to Provider    Relationship to Patient: Self     Additional Information Patient wanted to notified Dr. Niki Ngo that she wanted the ct scan to be done in [facility] [place] [address]  not in [place].    --------------------------------------------------------------------------------------------------------------------------    Call Back Information: OK to leave message on voicemail  Preferred Call Back Number: Phone [phone]

## 2024-10-10 ENCOUNTER — NURSE ONLY (OUTPATIENT)
Dept: PRIMARY CARE CLINIC | Age: 84
End: 2024-10-10

## 2024-10-10 VITALS — SYSTOLIC BLOOD PRESSURE: 184 MMHG | DIASTOLIC BLOOD PRESSURE: 92 MMHG

## 2024-10-10 DIAGNOSIS — I10 ESSENTIAL HYPERTENSION: Primary | ICD-10-CM

## 2024-10-10 NOTE — PROGRESS NOTES
Blood pressure check was high. Patient is stressed bc the caregiver for her  cancelled on them today. She said her neighbor offered to sit with Luis Fernando if she was able to come in right then, but she was very chatty about that happening, so her pressure was obviously going to be high. I told her that I was going to step out of the room for a few minutes and advised that she needed to relax- but that made her anxious again bc she only had a few minutes to be here bc she had to get home. I assured her I would only be 2 minutes, but I needed her to relax. Rechecked her pressure and it had come down some. She said she has been getting high readings lately and she has noticed that her L foot is swelling. She states that she has never had any issue with swelling ever before, so that was the only thing that seems off to her.

## 2024-10-11 NOTE — PROGRESS NOTES
Obviously her stress and anxiety does play a role, but her readings are quite elevated  Would she be amenable to additional blood pressure medication at this time, with the caveat that we can discontinue it later should her readings stabilize again?

## 2024-10-15 NOTE — PROGRESS NOTES
Patient was agreeable to additional medication to be sent to [facility]. She wants to know if you want her appt to be moved up to recheck her blood pressures?

## 2024-10-16 RX ORDER — LOSARTAN POTASSIUM 25 MG/1
25 TABLET ORAL DAILY
Qty: 30 TABLET | Refills: 5 | Status: SHIPPED | OUTPATIENT
Start: 2024-10-16

## 2024-10-16 NOTE — PROGRESS NOTES
Losartan sent.  Yes please see if we can get her in for follow-up next week or the week after to reassess.

## 2024-10-18 ENCOUNTER — TELEPHONE (OUTPATIENT)
Dept: PRIMARY CARE CLINIC | Age: 84
End: 2024-10-18

## 2024-10-18 NOTE — TELEPHONE ENCOUNTER
Patient came in for a blood pressure check on 10/10. She was started on another blood pressure medication. She picked it up yesterday and started it. Patient will be in on 10/24 for an additional blood pressure check.       The forms are on your desk. To be signed for for the medical consult/clearance for dental care

## 2024-10-24 ENCOUNTER — LAB (OUTPATIENT)
Dept: PRIMARY CARE CLINIC | Age: 84
End: 2024-10-24

## 2024-10-24 VITALS — DIASTOLIC BLOOD PRESSURE: 80 MMHG | SYSTOLIC BLOOD PRESSURE: 170 MMHG

## 2024-11-01 ENCOUNTER — TELEPHONE (OUTPATIENT)
Dept: PRIMARY CARE CLINIC | Age: 84
End: 2024-11-01

## 2024-11-01 NOTE — TELEPHONE ENCOUNTER
Patient will be in on Tuesday November 05 to have blood pressure check and a flu shot. Her dental clearance forms are on your desk

## 2024-11-04 DIAGNOSIS — R63.5 WEIGHT GAIN: ICD-10-CM

## 2024-11-04 DIAGNOSIS — R14.0 ABDOMINAL BLOATING: ICD-10-CM

## 2024-11-05 ENCOUNTER — OFFICE VISIT (OUTPATIENT)
Dept: PRIMARY CARE CLINIC | Age: 84
End: 2024-11-05

## 2024-11-05 VITALS
WEIGHT: 135 LBS | HEIGHT: 64 IN | OXYGEN SATURATION: 95 % | RESPIRATION RATE: 16 BRPM | DIASTOLIC BLOOD PRESSURE: 90 MMHG | HEART RATE: 58 BPM | BODY MASS INDEX: 23.05 KG/M2 | TEMPERATURE: 98.5 F | SYSTOLIC BLOOD PRESSURE: 160 MMHG

## 2024-11-05 DIAGNOSIS — I10 ESSENTIAL HYPERTENSION: ICD-10-CM

## 2024-11-05 DIAGNOSIS — Z23 NEED FOR INFLUENZA VACCINATION: ICD-10-CM

## 2024-11-05 DIAGNOSIS — K86.89 MASS OF PANCREAS: Primary | ICD-10-CM

## 2024-11-05 DIAGNOSIS — R22.43 LOCALIZED SWELLING OF BOTH LOWER LEGS: ICD-10-CM

## 2024-11-05 PROBLEM — C43.9 MELANOMA (HCC): Status: RESOLVED | Noted: 2019-10-21 | Resolved: 2024-11-05

## 2024-11-05 RX ORDER — ATORVASTATIN CALCIUM 80 MG/1
TABLET, FILM COATED ORAL
Qty: 90 TABLET | Refills: 3 | Status: CANCELLED | OUTPATIENT
Start: 2024-11-05

## 2024-11-05 RX ORDER — FAMOTIDINE 20 MG/1
20 TABLET, FILM COATED ORAL 2 TIMES DAILY
Qty: 180 TABLET | Refills: 1 | Status: SHIPPED | OUTPATIENT
Start: 2024-11-05

## 2024-11-05 RX ORDER — MECLIZINE HYDROCHLORIDE 25 MG/1
25 TABLET ORAL DAILY PRN
Qty: 90 TABLET | Refills: 3 | Status: CANCELLED | OUTPATIENT
Start: 2024-11-05

## 2024-11-05 RX ORDER — ACETAMINOPHEN 160 MG
2000 TABLET,DISINTEGRATING ORAL DAILY
Qty: 90 CAPSULE | Refills: 3 | Status: CANCELLED | OUTPATIENT
Start: 2024-11-05

## 2024-11-05 RX ORDER — LOSARTAN POTASSIUM 50 MG/1
50 TABLET ORAL DAILY
Qty: 90 TABLET | Refills: 1 | Status: SHIPPED | OUTPATIENT
Start: 2024-11-05

## 2024-11-05 RX ORDER — MULTIVIT WITH MINERALS/LUTEIN
250 TABLET ORAL DAILY
Qty: 30 TABLET | Refills: 3 | Status: CANCELLED | OUTPATIENT
Start: 2024-11-05

## 2024-11-05 RX ORDER — LEVOTHYROXINE SODIUM 75 UG/1
TABLET ORAL
Qty: 150 TABLET | Refills: 3 | Status: CANCELLED | OUTPATIENT
Start: 2024-11-05

## 2024-11-05 RX ORDER — METOPROLOL SUCCINATE 50 MG/1
TABLET, EXTENDED RELEASE ORAL
Qty: 180 TABLET | Refills: 1 | Status: SHIPPED | OUTPATIENT
Start: 2024-11-05

## 2024-11-05 SDOH — ECONOMIC STABILITY: FOOD INSECURITY: WITHIN THE PAST 12 MONTHS, YOU WORRIED THAT YOUR FOOD WOULD RUN OUT BEFORE YOU GOT MONEY TO BUY MORE.: NEVER TRUE

## 2024-11-05 SDOH — ECONOMIC STABILITY: FOOD INSECURITY: WITHIN THE PAST 12 MONTHS, THE FOOD YOU BOUGHT JUST DIDN'T LAST AND YOU DIDN'T HAVE MONEY TO GET MORE.: NEVER TRUE

## 2024-11-05 SDOH — ECONOMIC STABILITY: INCOME INSECURITY: HOW HARD IS IT FOR YOU TO PAY FOR THE VERY BASICS LIKE FOOD, HOUSING, MEDICAL CARE, AND HEATING?: NOT HARD AT ALL

## 2024-11-05 NOTE — PROGRESS NOTES
24  Karina Cruz : 1940 Sex: female  Age: 84 y.o.      Assessment and Plan:  Karina was seen today for hypertension.    Diagnoses and all orders for this visit:    Mass of pancreas  -     CT ABDOMEN W CONTRAST; Future  -     Basic Metabolic Panel; Future    Essential hypertension  -     metoprolol succinate (TOPROL XL) 50 MG extended release tablet; Take 1 tablet by mouth twice daily  -     losartan (COZAAR) 50 MG tablet; Take 1 tablet by mouth daily  Uncontrolled.  Increase losartan to 50 mg.  Patient was encouraged to reach out next week with an update on her readings.    Need for influenza vaccination  -     Influenza, FLUAD Trivalent, (age 65 y+), IM, Preservative Free, 0.5mL    Localized swelling of both lower legs  Discussed with patient remote possibility of blood clot, especially if there is anything with this possible mass of the pancreas.  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  She is interested in doing lower extremity ultrasounds, but is not able to do them today.  She is able to do them Thursday.  We discussed that we can order them stat that morning.    Other orders  -     famotidine (PEPCID) 20 MG tablet; Take 1 tablet by mouth 2 times daily      No follow-ups on file.        Chief Complaint   Patient presents with    Hypertension       HPI  Pt here for blood pressure follow-up  Her readings have been elevated  This was noted previously at the dentist, and confirmed in our office  Losartan 25 mg was added   Previously she had only been on metoprolol XL 50 mg daily  Pressure today is somewhat improved, but remains elevated    Having some swelling left foot   Noted the past month or so  Also having some purplish discoloration as well in the foot; today she woke up and there was a band just below the toes  Cramping B/L calves, but more so left  Afterwards gets a restless leg sensation

## 2024-11-07 ENCOUNTER — TELEPHONE (OUTPATIENT)
Dept: PRIMARY CARE CLINIC | Age: 84
End: 2024-11-07

## 2024-11-07 NOTE — TELEPHONE ENCOUNTER
I think it is pretty good for her age.  Can you please put that result on her dental paperwork which is on my desk.  I will sign the form on Monday.

## 2024-11-07 NOTE — TELEPHONE ENCOUNTER
Patient called and states that you told her to call in her BP for today. She states that it was 149/83 at 345 pm.

## 2024-11-07 NOTE — TELEPHONE ENCOUNTER
----- Message from Dr. Niki Ngo MD sent at 11/7/2024  8:41 AM EST -----  Stat lower extremity dopplers ordered to be done in [place] today. Pt was not able to go sooner due to caring for her . Mandy said with medicare there's no prior-auth, just have to send over and alert patient. Thank you!

## 2024-11-11 ENCOUNTER — TELEPHONE (OUTPATIENT)
Dept: PRIMARY CARE CLINIC | Age: 84
End: 2024-11-11

## 2024-11-11 DIAGNOSIS — R22.43 LOCALIZED SWELLING OF BOTH LOWER LEGS: ICD-10-CM

## 2024-11-11 NOTE — TELEPHONE ENCOUNTER
Those readings are good.  Continue current medications.  Her dental center paperwork has been completed.

## 2024-11-11 NOTE — TELEPHONE ENCOUNTER
Pt called in with BP readings over the past week.   She states they were all taken around 10 am, after she had taken her medicine.     11/7 149/83   11/8 158/86   11/9 138/79   11/10 117/64   11/11 106/61

## 2024-11-25 DIAGNOSIS — K86.89 MASS OF PANCREAS: ICD-10-CM

## 2024-11-25 DIAGNOSIS — K86.89 MASS OF PANCREAS: Primary | ICD-10-CM

## 2024-12-02 DIAGNOSIS — K86.89 MASS OF PANCREAS: Primary | ICD-10-CM

## 2024-12-06 ENCOUNTER — TELEPHONE (OUTPATIENT)
Dept: PRIMARY CARE CLINIC | Age: 84
End: 2024-12-06

## 2024-12-10 ENCOUNTER — TELEPHONE (OUTPATIENT)
Dept: HEMATOLOGY | Age: 84
End: 2024-12-10

## 2024-12-18 ENCOUNTER — OFFICE VISIT (OUTPATIENT)
Dept: PRIMARY CARE CLINIC | Age: 84
End: 2024-12-18
Payer: MEDICARE

## 2024-12-18 VITALS
RESPIRATION RATE: 16 BRPM | HEIGHT: 64 IN | SYSTOLIC BLOOD PRESSURE: 138 MMHG | HEART RATE: 75 BPM | OXYGEN SATURATION: 98 % | BODY MASS INDEX: 23.17 KG/M2 | DIASTOLIC BLOOD PRESSURE: 88 MMHG

## 2024-12-18 VITALS
BODY MASS INDEX: 23.05 KG/M2 | HEIGHT: 64 IN | DIASTOLIC BLOOD PRESSURE: 88 MMHG | OXYGEN SATURATION: 98 % | SYSTOLIC BLOOD PRESSURE: 138 MMHG | WEIGHT: 135 LBS | RESPIRATION RATE: 16 BRPM

## 2024-12-18 DIAGNOSIS — E03.9 ACQUIRED HYPOTHYROIDISM: ICD-10-CM

## 2024-12-18 DIAGNOSIS — M79.2 FOOT NEURALGIA: ICD-10-CM

## 2024-12-18 DIAGNOSIS — E55.9 VITAMIN D DEFICIENCY: ICD-10-CM

## 2024-12-18 DIAGNOSIS — K86.89 MASS OF PANCREAS: Primary | ICD-10-CM

## 2024-12-18 DIAGNOSIS — Z00.00 MEDICARE ANNUAL WELLNESS VISIT, SUBSEQUENT: Primary | ICD-10-CM

## 2024-12-18 DIAGNOSIS — I10 ESSENTIAL HYPERTENSION: ICD-10-CM

## 2024-12-18 DIAGNOSIS — E78.2 MIXED HYPERLIPIDEMIA: ICD-10-CM

## 2024-12-18 DIAGNOSIS — L84 CORN OF FOOT: ICD-10-CM

## 2024-12-18 DIAGNOSIS — R42 DIZZINESS: ICD-10-CM

## 2024-12-18 LAB — 25-HYDROXY VITAMIN D-2: 61 NG/ML (ref 30–100)

## 2024-12-18 PROCEDURE — 1159F MED LIST DOCD IN RCRD: CPT | Performed by: FAMILY MEDICINE

## 2024-12-18 PROCEDURE — 3075F SYST BP GE 130 - 139MM HG: CPT | Performed by: FAMILY MEDICINE

## 2024-12-18 PROCEDURE — 3079F DIAST BP 80-89 MM HG: CPT | Performed by: FAMILY MEDICINE

## 2024-12-18 PROCEDURE — G0439 PPPS, SUBSEQ VISIT: HCPCS | Performed by: FAMILY MEDICINE

## 2024-12-18 PROCEDURE — 1123F ACP DISCUSS/DSCN MKR DOCD: CPT | Performed by: FAMILY MEDICINE

## 2024-12-18 PROCEDURE — 99214 OFFICE O/P EST MOD 30 MIN: CPT | Performed by: FAMILY MEDICINE

## 2024-12-18 PROCEDURE — 1160F RVW MEDS BY RX/DR IN RCRD: CPT | Performed by: FAMILY MEDICINE

## 2024-12-18 RX ORDER — LEVOTHYROXINE SODIUM 75 UG/1
TABLET ORAL
Qty: 150 TABLET | Refills: 3 | Status: SHIPPED | OUTPATIENT
Start: 2024-12-18

## 2024-12-18 RX ORDER — MULTIVIT WITH MINERALS/LUTEIN
250 TABLET ORAL DAILY
Qty: 30 TABLET | Refills: 3 | Status: SHIPPED | OUTPATIENT
Start: 2024-12-18

## 2024-12-18 RX ORDER — METOPROLOL SUCCINATE 50 MG/1
TABLET, EXTENDED RELEASE ORAL
Qty: 180 TABLET | Refills: 1 | Status: CANCELLED | OUTPATIENT
Start: 2024-12-18

## 2024-12-18 RX ORDER — ACETAMINOPHEN 160 MG
2000 TABLET,DISINTEGRATING ORAL DAILY
Qty: 90 CAPSULE | Refills: 3 | Status: SHIPPED | OUTPATIENT
Start: 2024-12-18

## 2024-12-18 RX ORDER — LOSARTAN POTASSIUM 50 MG/1
50 TABLET ORAL DAILY
Qty: 90 TABLET | Refills: 1 | Status: CANCELLED | OUTPATIENT
Start: 2024-12-18

## 2024-12-18 RX ORDER — FAMOTIDINE 20 MG/1
20 TABLET, FILM COATED ORAL 2 TIMES DAILY
Qty: 180 TABLET | Refills: 1 | Status: CANCELLED | OUTPATIENT
Start: 2024-12-18

## 2024-12-18 RX ORDER — MECLIZINE HYDROCHLORIDE 25 MG/1
25 TABLET ORAL DAILY PRN
Qty: 90 TABLET | Refills: 3 | Status: SHIPPED | OUTPATIENT
Start: 2024-12-18

## 2024-12-18 RX ORDER — ATORVASTATIN CALCIUM 80 MG/1
TABLET, FILM COATED ORAL
Qty: 90 TABLET | Refills: 3 | Status: SHIPPED | OUTPATIENT
Start: 2024-12-18

## 2024-12-18 ASSESSMENT — PATIENT HEALTH QUESTIONNAIRE - PHQ9
SUM OF ALL RESPONSES TO PHQ QUESTIONS 1-9: 0
SUM OF ALL RESPONSES TO PHQ9 QUESTIONS 1 & 2: 0
SUM OF ALL RESPONSES TO PHQ QUESTIONS 1-9: 0
1. LITTLE INTEREST OR PLEASURE IN DOING THINGS: NOT AT ALL
2. FEELING DOWN, DEPRESSED OR HOPELESS: NOT AT ALL

## 2024-12-18 ASSESSMENT — LIFESTYLE VARIABLES
HOW OFTEN DO YOU HAVE A DRINK CONTAINING ALCOHOL: NEVER
HOW MANY STANDARD DRINKS CONTAINING ALCOHOL DO YOU HAVE ON A TYPICAL DAY: PATIENT DOES NOT DRINK

## 2024-12-18 NOTE — PATIENT INSTRUCTIONS
Care Instructions  Overview     Coronary artery disease, also called heart disease, occurs when a substance called plaque builds up in the vessels that supply oxygen-rich blood to your heart muscle. This can narrow the blood vessels and reduce blood flow. A heart attack happens when blood flow is completely blocked. A high-fat diet, smoking, and other factors increase the risk of heart disease.  Your doctor has found that you have a chance of having heart disease. A heart-healthy lifestyle can help keep your heart healthy and prevent heart disease. This lifestyle includes eating healthy, being active, staying at a weight that's healthy for you, and not smoking or using tobacco. It also includes taking medicines as directed, managing other health conditions, and trying to get a healthy amount of sleep.  Follow-up care is a key part of your treatment and safety. Be sure to make and go to all appointments, and call your doctor if you are having problems. It's also a good idea to know your test results and keep a list of the medicines you take.  How can you care for yourself at home?  Diet    Use less salt when you cook and eat. This helps lower your blood pressure. Taste food before salting. Add only a little salt when you think you need it. With time, your taste buds will adjust to less salt.     Eat fewer snack items, fast foods, canned soups, and other high-salt, high-fat, processed foods.     Read food labels and try to avoid saturated and trans fats. They increase your risk of heart disease by raising cholesterol levels.     Limit the amount of solid fat--butter, margarine, and shortening--you eat. Use olive, peanut, or canola oil when you cook. Bake, broil, and steam foods instead of frying them.     Eat a variety of fruit and vegetables every day. Dark green, deep orange, red, or yellow fruits and vegetables are especially good for you. Examples include spinach, carrots, peaches, and berries.     Foods high in

## 2024-12-18 NOTE — PROGRESS NOTES
appropriate and relevant regarding any  possible side effects, risks, and alternatives to treatment; patient and/or guardian verbalizes understanding, and is in agreement with the plan as detailed above.      Reviewed age and gender appropriate health screening exams and vaccinations.  Advised patient regarding importance of keeping up with recommended health maintenance and to schedule as soon as possible if overdue, as this is important in assessing for undiagnosed pathology, especially cancer, as well as protecting against potentially harmful/life threatening disease.      If discussed, any educational materials and/or home exercises printed for patient's review and were included in patient instructions on his/her After Visit Summary and given to patient at the end of visit.      Advised patient to call with any new medication issues, and and other concerns/complaints prior to scheduled follow up.  All questions answered to the patient's satisfaction.        Seen By:  iNki Ngo MD    
[phone]

## 2024-12-18 NOTE — PROGRESS NOTES
mouth daily  Niki Ngo MD   famotidine (PEPCID) 20 MG tablet Take 1 tablet by mouth 2 times daily  Niki Ngo MD   BIOTIN MAXIMUM PO Take by mouth  Provider, MD Lazaro   aspirin 81 MG tablet Take 1 tablet by mouth daily  Aditi Bailey DO CareTeam (Including outside providers/suppliers regularly involved in providing care):   Patient Care Team:  Niki Ngo MD as PCP - General (Family Medicine)  Niki Ngo MD as PCP - Empaneled Provider      Reviewed and updated this visit:

## 2025-03-19 ENCOUNTER — OFFICE VISIT (OUTPATIENT)
Dept: PRIMARY CARE CLINIC | Age: 85
End: 2025-03-19
Payer: MEDICARE

## 2025-03-19 VITALS
SYSTOLIC BLOOD PRESSURE: 136 MMHG | DIASTOLIC BLOOD PRESSURE: 82 MMHG | HEART RATE: 56 BPM | WEIGHT: 133 LBS | BODY MASS INDEX: 22.71 KG/M2 | OXYGEN SATURATION: 95 % | RESPIRATION RATE: 16 BRPM | HEIGHT: 64 IN

## 2025-03-19 DIAGNOSIS — I25.10 CORONARY ARTERY DISEASE INVOLVING NATIVE CORONARY ARTERY OF NATIVE HEART WITHOUT ANGINA PECTORIS: ICD-10-CM

## 2025-03-19 DIAGNOSIS — K86.89 MASS OF PANCREAS: Primary | ICD-10-CM

## 2025-03-19 DIAGNOSIS — E78.2 MIXED HYPERLIPIDEMIA: ICD-10-CM

## 2025-03-19 DIAGNOSIS — K21.9 GASTROESOPHAGEAL REFLUX DISEASE WITHOUT ESOPHAGITIS: ICD-10-CM

## 2025-03-19 DIAGNOSIS — I10 ESSENTIAL HYPERTENSION: ICD-10-CM

## 2025-03-19 DIAGNOSIS — N18.31 STAGE 3A CHRONIC KIDNEY DISEASE (HCC): ICD-10-CM

## 2025-03-19 DIAGNOSIS — E03.9 ACQUIRED HYPOTHYROIDISM: ICD-10-CM

## 2025-03-19 PROCEDURE — 3075F SYST BP GE 130 - 139MM HG: CPT | Performed by: FAMILY MEDICINE

## 2025-03-19 PROCEDURE — 1123F ACP DISCUSS/DSCN MKR DOCD: CPT | Performed by: FAMILY MEDICINE

## 2025-03-19 PROCEDURE — 99214 OFFICE O/P EST MOD 30 MIN: CPT | Performed by: FAMILY MEDICINE

## 2025-03-19 PROCEDURE — 3079F DIAST BP 80-89 MM HG: CPT | Performed by: FAMILY MEDICINE

## 2025-03-19 PROCEDURE — G2211 COMPLEX E/M VISIT ADD ON: HCPCS | Performed by: FAMILY MEDICINE

## 2025-03-19 PROCEDURE — 1159F MED LIST DOCD IN RCRD: CPT | Performed by: FAMILY MEDICINE

## 2025-03-19 SDOH — ECONOMIC STABILITY: FOOD INSECURITY: WITHIN THE PAST 12 MONTHS, YOU WORRIED THAT YOUR FOOD WOULD RUN OUT BEFORE YOU GOT MONEY TO BUY MORE.: NEVER TRUE

## 2025-03-19 SDOH — ECONOMIC STABILITY: FOOD INSECURITY: WITHIN THE PAST 12 MONTHS, THE FOOD YOU BOUGHT JUST DIDN'T LAST AND YOU DIDN'T HAVE MONEY TO GET MORE.: NEVER TRUE

## 2025-03-19 ASSESSMENT — PATIENT HEALTH QUESTIONNAIRE - PHQ9
SUM OF ALL RESPONSES TO PHQ QUESTIONS 1-9: 0
SUM OF ALL RESPONSES TO PHQ QUESTIONS 1-9: 0
2. FEELING DOWN, DEPRESSED OR HOPELESS: NOT AT ALL
1. LITTLE INTEREST OR PLEASURE IN DOING THINGS: NOT AT ALL
SUM OF ALL RESPONSES TO PHQ QUESTIONS 1-9: 0
SUM OF ALL RESPONSES TO PHQ QUESTIONS 1-9: 0

## 2025-03-19 NOTE — PROGRESS NOTES
appearance.   HENT:      Head: Normocephalic and atraumatic.   Eyes:      Conjunctiva/sclera: Conjunctivae normal.   Cardiovascular:      Rate and Rhythm: Normal rate and regular rhythm.      Heart sounds: Normal heart sounds.   Pulmonary:      Effort: Pulmonary effort is normal.      Breath sounds: Normal breath sounds.   Abdominal:      Palpations: Abdomen is soft.      Tenderness: There is no abdominal tenderness.   Musculoskeletal:         General: Normal range of motion.   Skin:     General: Skin is warm and dry.   Neurological:      General: No focal deficit present.      Mental Status: She is alert and oriented to person, place, and time.   Psychiatric:         Mood and Affect: Mood normal.         Behavior: Behavior normal.        Counseled patient as appropriate and relevant regarding above diagnosis, including possible risks and complications, especially if left uncontrolled.  Counseled patient as appropriate and relevant regarding any  possible side effects, risks, and alternatives to treatment; patient and/or guardian verbalizes understanding, and is in agreement with the plan as detailed above.      Reviewed age and gender appropriate health screening exams and vaccinations.  Advised patient regarding importance of keeping up with recommended health maintenance and to schedule as soon as possible if overdue, as this is important in assessing for undiagnosed pathology, especially cancer, as well as protecting against potentially harmful/life threatening disease.      If discussed, any educational materials and/or home exercises printed for patient's review and were included in patient instructions on his/her After Visit Summary and given to patient at the end of visit.      Advised patient to call with any new medication issues, and and other concerns/complaints prior to scheduled follow up.  All questions answered to the patient's satisfaction.        Seen By:  Niki Ngo MD

## 2025-03-29 PROBLEM — K86.89 MASS OF PANCREAS: Status: ACTIVE | Noted: 2025-03-29

## 2025-04-07 ENCOUNTER — OFFICE VISIT (OUTPATIENT)
Dept: PODIATRY | Age: 85
End: 2025-04-07
Payer: MEDICARE

## 2025-04-07 VITALS — BODY MASS INDEX: 22.83 KG/M2 | WEIGHT: 133 LBS

## 2025-04-07 DIAGNOSIS — M20.42 HAMMER TOES OF BOTH FEET: ICD-10-CM

## 2025-04-07 DIAGNOSIS — L84 CORNS AND CALLOSITIES: ICD-10-CM

## 2025-04-07 DIAGNOSIS — G60.8 HEREDITARY SENSORY NEUROPATHY: ICD-10-CM

## 2025-04-07 DIAGNOSIS — M21.619 BUNION: Primary | ICD-10-CM

## 2025-04-07 DIAGNOSIS — M20.41 HAMMER TOES OF BOTH FEET: ICD-10-CM

## 2025-04-07 PROCEDURE — 1159F MED LIST DOCD IN RCRD: CPT | Performed by: PODIATRIST

## 2025-04-07 PROCEDURE — 1123F ACP DISCUSS/DSCN MKR DOCD: CPT | Performed by: PODIATRIST

## 2025-04-07 PROCEDURE — 99203 OFFICE O/P NEW LOW 30 MIN: CPT | Performed by: PODIATRIST

## 2025-04-07 RX ORDER — AMMONIUM LACTATE 12 G/100G
LOTION TOPICAL
Qty: 400 G | Refills: 2 | Status: SHIPPED | OUTPATIENT
Start: 2025-04-07

## 2025-04-07 NOTE — PROGRESS NOTES
New patient referred PCP  Numbness in bilateral lower legs and feet    Corns and calloses   Niki Ngo MD  LOV 3/15/25    Electronically signed by Venecia Root LPN on 2025 at 1:45 PM      Karina Cruz : 1940 Sex: female  Age: 84 y.o.    Patient was referred by Niki Ngo MD    CC:    Callus tissue both feet  History neuropathy    HPI:   This 84-year-old female patient referred me today history of neuropathy and callus tissue.  No open wounds.  No recent injury or trauma.  Does state that the no recent injury or trauma.  No recent formal treatment or therapy for numbness.  Denies any recent treatment for callus tissue.  No additional pedal complaints today.  No additional pedal complaints at this time.    ROS:  Const: Denies constitutional symptoms  Musculo: Denies symptoms other than stated above  Skin: Denies symptoms other than stated above       Current Outpatient Medications:     ammonium lactate (LAC-HYDRIN) 12 % lotion, Apply topically twice daily, Disp: 400 g, Rfl: 2    ammonium lactate (LAC-HYDRIN) 12 % lotion, Apply topically twice daily, Disp: 400 g, Rfl: 2    meclizine (ANTIVERT) 25 MG tablet, Take 1 tablet by mouth daily as needed for Dizziness, Disp: 90 tablet, Rfl: 3    levothyroxine (SYNTHROID) 75 MCG tablet, TAKE 1 TABLET BY MOUTH ONCE DAILY -ON  MONDAY,  TAKE 2 TABS, ON WED AND , TAKE 1.5 TABS all other days, Disp: 150 tablet, Rfl: 3    vitamin D (VITAMIN D3) 50 MCG (2000) CAPS capsule, Take 1 capsule by mouth daily, Disp: 90 capsule, Rfl: 3    atorvastatin (LIPITOR) 80 MG tablet, Take 1 tablet by mouth once daily, Disp: 90 tablet, Rfl: 3    Ascorbic Acid (VITAMIN C) 250 MG tablet, Take 1 tablet by mouth daily, Disp: 30 tablet, Rfl: 3    metoprolol succinate (TOPROL XL) 50 MG extended release tablet, Take 1 tablet by mouth twice daily, Disp: 180 tablet, Rfl: 1    losartan (COZAAR) 50 MG tablet, Take 1 tablet by mouth daily, Disp: 90 tablet, Rfl: 1

## 2025-04-30 DIAGNOSIS — I10 ESSENTIAL HYPERTENSION: ICD-10-CM

## 2025-04-30 RX ORDER — LOSARTAN POTASSIUM 50 MG/1
50 TABLET ORAL DAILY
Qty: 90 TABLET | Refills: 0 | Status: SHIPPED | OUTPATIENT
Start: 2025-04-30

## 2025-07-25 DIAGNOSIS — I10 ESSENTIAL HYPERTENSION: ICD-10-CM

## 2025-07-25 RX ORDER — LOSARTAN POTASSIUM 50 MG/1
50 TABLET ORAL DAILY
Qty: 90 TABLET | Refills: 0 | Status: SHIPPED | OUTPATIENT
Start: 2025-07-25

## 2025-07-25 NOTE — TELEPHONE ENCOUNTER
Name of Medication(s) Requested:  Requested Prescriptions     Pending Prescriptions Disp Refills    losartan (COZAAR) 50 MG tablet [Pharmacy Med Name: Losartan Potassium 50 MG Oral Tablet] 90 tablet 0     Sig: Take 1 tablet by mouth once daily       Medication is on current medication list Yes    Dosage and directions were verified? Yes    Quantity verified: 90 day supply     Pharmacy Verified?  Yes    Last Appointment:  3/19/2025    Future appts:  Future Appointments   Date Time Provider Department Center   7/28/2025  1:00 PM Mariano Pineda DPM [facility]   9/2/2025 11:00 AM Niik Ngo MD [facility]        (If no appt send self scheduling link. .REFILLAPPT)  Scheduling request sent?     [] Yes  [x] No    Does patient need updated?  [] Yes  [x] No

## 2025-07-28 ENCOUNTER — OFFICE VISIT (OUTPATIENT)
Dept: PODIATRY | Age: 85
End: 2025-07-28
Payer: MEDICARE

## 2025-07-28 VITALS — TEMPERATURE: 97.5 F | WEIGHT: 133 LBS | BODY MASS INDEX: 22.83 KG/M2

## 2025-07-28 DIAGNOSIS — G60.8 HEREDITARY SENSORY NEUROPATHY: ICD-10-CM

## 2025-07-28 DIAGNOSIS — M20.42 HAMMER TOES OF BOTH FEET: ICD-10-CM

## 2025-07-28 DIAGNOSIS — M21.619 BUNION: Primary | ICD-10-CM

## 2025-07-28 DIAGNOSIS — M20.41 HAMMER TOES OF BOTH FEET: ICD-10-CM

## 2025-07-28 DIAGNOSIS — L84 CORNS AND CALLOSITIES: ICD-10-CM

## 2025-07-28 PROCEDURE — 1159F MED LIST DOCD IN RCRD: CPT | Performed by: PODIATRIST

## 2025-07-28 PROCEDURE — 1123F ACP DISCUSS/DSCN MKR DOCD: CPT | Performed by: PODIATRIST

## 2025-07-28 PROCEDURE — 99213 OFFICE O/P EST LOW 20 MIN: CPT | Performed by: PODIATRIST

## 2025-08-15 ENCOUNTER — OFFICE VISIT (OUTPATIENT)
Dept: FAMILY MEDICINE CLINIC | Age: 85
End: 2025-08-15

## 2025-08-15 VITALS
HEIGHT: 64 IN | OXYGEN SATURATION: 95 % | WEIGHT: 133 LBS | BODY MASS INDEX: 22.71 KG/M2 | HEART RATE: 78 BPM | SYSTOLIC BLOOD PRESSURE: 144 MMHG | RESPIRATION RATE: 18 BRPM | DIASTOLIC BLOOD PRESSURE: 86 MMHG | TEMPERATURE: 98 F

## 2025-08-15 DIAGNOSIS — N39.0 URINARY TRACT INFECTION WITH HEMATURIA, SITE UNSPECIFIED: Primary | ICD-10-CM

## 2025-08-15 DIAGNOSIS — R31.9 HEMATURIA, UNSPECIFIED TYPE: ICD-10-CM

## 2025-08-15 DIAGNOSIS — R31.9 URINARY TRACT INFECTION WITH HEMATURIA, SITE UNSPECIFIED: Primary | ICD-10-CM

## 2025-08-15 LAB
BILIRUBIN, POC: NORMAL
BLOOD URINE, POC: NORMAL
CLARITY, POC: NORMAL
COLOR, POC: YELLOW
GLUCOSE URINE, POC: NORMAL MG/DL
KETONES, POC: NORMAL MG/DL
LEUKOCYTE EST, POC: NORMAL
NITRITE, POC: NORMAL
PH, POC: 5.5
PROTEIN, POC: 100 MG/DL
SPECIFIC GRAVITY, POC: 1.01
UROBILINOGEN, POC: 0.2 MG/DL

## 2025-08-15 RX ORDER — CEFDINIR 300 MG/1
300 CAPSULE ORAL 2 TIMES DAILY
Qty: 14 CAPSULE | Refills: 0 | Status: SHIPPED | OUTPATIENT
Start: 2025-08-15 | End: 2025-08-22

## 2025-08-16 LAB
CULTURE: NORMAL
CULTURE: NORMAL
SPECIMEN DESCRIPTION: NORMAL